# Patient Record
Sex: FEMALE | Race: WHITE | NOT HISPANIC OR LATINO
[De-identification: names, ages, dates, MRNs, and addresses within clinical notes are randomized per-mention and may not be internally consistent; named-entity substitution may affect disease eponyms.]

---

## 2017-07-21 PROBLEM — Z00.00 ENCOUNTER FOR PREVENTIVE HEALTH EXAMINATION: Status: ACTIVE | Noted: 2017-07-21

## 2017-07-26 ENCOUNTER — APPOINTMENT (OUTPATIENT)
Dept: OPHTHALMOLOGY | Facility: CLINIC | Age: 62
End: 2017-07-26

## 2017-07-26 DIAGNOSIS — H18.603 KERATOCONUS, UNSPECIFIED, BILATERAL: ICD-10-CM

## 2017-07-26 DIAGNOSIS — H53.142 VISUAL DISCOMFORT, LEFT EYE: ICD-10-CM

## 2017-07-26 DIAGNOSIS — Z96.1 PRESENCE OF INTRAOCULAR LENS: ICD-10-CM

## 2017-07-26 DIAGNOSIS — H35.351 CYSTOID MACULAR DEGENERATION, RIGHT EYE: ICD-10-CM

## 2017-07-26 DIAGNOSIS — H53.141 VISUAL DISCOMFORT, RIGHT EYE: ICD-10-CM

## 2017-08-30 ENCOUNTER — APPOINTMENT (OUTPATIENT)
Dept: OPHTHALMOLOGY | Facility: CLINIC | Age: 62
End: 2017-08-30
Payer: SELF-PAY

## 2017-08-30 PROCEDURE — 92015A: CUSTOM

## 2019-01-15 ENCOUNTER — APPOINTMENT (OUTPATIENT)
Dept: UROGYNECOLOGY | Facility: CLINIC | Age: 64
End: 2019-01-15
Payer: COMMERCIAL

## 2019-01-15 DIAGNOSIS — Z78.9 OTHER SPECIFIED HEALTH STATUS: ICD-10-CM

## 2019-01-15 DIAGNOSIS — H40.9 UNSPECIFIED GLAUCOMA: ICD-10-CM

## 2019-01-15 DIAGNOSIS — Z83.49 FAMILY HISTORY OF OTHER ENDOCRINE, NUTRITIONAL AND METABOLIC DISEASES: ICD-10-CM

## 2019-01-15 DIAGNOSIS — R31.29 OTHER MICROSCOPIC HEMATURIA: ICD-10-CM

## 2019-01-15 DIAGNOSIS — N39.8 OTHER SPECIFIED DISORDERS OF URINARY SYSTEM: ICD-10-CM

## 2019-01-15 DIAGNOSIS — K59.00 CONSTIPATION, UNSPECIFIED: ICD-10-CM

## 2019-01-15 DIAGNOSIS — N81.4 UTEROVAGINAL PROLAPSE, UNSPECIFIED: ICD-10-CM

## 2019-01-15 DIAGNOSIS — Z80.52 FAMILY HISTORY OF MALIGNANT NEOPLASM OF BLADDER: ICD-10-CM

## 2019-01-15 DIAGNOSIS — Z84.1 FAMILY HISTORY OF DISORDERS OF KIDNEY AND URETER: ICD-10-CM

## 2019-01-15 DIAGNOSIS — N95.2 POSTMENOPAUSAL ATROPHIC VAGINITIS: ICD-10-CM

## 2019-01-15 LAB
BILIRUB UR QL STRIP: NORMAL
CLARITY UR: CLEAR
COLLECTION METHOD: NORMAL
GLUCOSE UR-MCNC: NORMAL
HCG UR QL: 0.2 EU/DL
HGB UR QL STRIP.AUTO: NORMAL
KETONES UR-MCNC: NORMAL
LEUKOCYTE ESTERASE UR QL STRIP: NORMAL
NITRITE UR QL STRIP: NORMAL
PH UR STRIP: 6.5
PROT UR STRIP-MCNC: NORMAL
SP GR UR STRIP: 1

## 2019-01-15 PROCEDURE — 51725 SIMPLE CYSTOMETROGRAM: CPT

## 2019-01-15 PROCEDURE — 99204 OFFICE O/P NEW MOD 45 MIN: CPT | Mod: 25

## 2019-01-15 PROCEDURE — 81003 URINALYSIS AUTO W/O SCOPE: CPT | Mod: QW

## 2019-01-16 ENCOUNTER — RESULT REVIEW (OUTPATIENT)
Age: 64
End: 2019-01-16

## 2019-01-16 PROBLEM — N39.8 VOIDING DYSFUNCTION: Status: ACTIVE | Noted: 2019-01-16

## 2019-01-16 PROBLEM — Z78.9 RARELY CONSUMES ALCOHOL: Status: ACTIVE | Noted: 2019-01-16

## 2019-01-16 PROBLEM — Z78.9 NON-SMOKER: Status: ACTIVE | Noted: 2019-01-16

## 2019-01-16 PROBLEM — N95.2 VAGINAL ATROPHY: Status: ACTIVE | Noted: 2019-01-16

## 2019-01-16 PROBLEM — Z80.52 FAMILY HISTORY OF MALIGNANT NEOPLASM OF URINARY BLADDER: Status: ACTIVE | Noted: 2019-01-16

## 2019-01-16 PROBLEM — N81.4 UTERINE PROLAPSE: Status: ACTIVE | Noted: 2019-01-16

## 2019-01-16 PROBLEM — Z84.1 FAMILY HISTORY OF KIDNEY DISEASE: Status: ACTIVE | Noted: 2019-01-16

## 2019-01-16 PROBLEM — K59.00 CONSTIPATION: Status: ACTIVE | Noted: 2019-01-16

## 2019-01-16 PROBLEM — H40.9 GLAUCOMA: Status: ACTIVE | Noted: 2019-01-16

## 2019-01-16 PROBLEM — Z83.49 FAMILY HISTORY OF THYROID DISEASE: Status: ACTIVE | Noted: 2019-01-16

## 2019-01-16 PROBLEM — Z83.49 FAMILY HISTORY OF OBESITY: Status: ACTIVE | Noted: 2019-01-16

## 2019-01-16 RX ORDER — ROSUVASTATIN CALCIUM 10 MG/1
10 TABLET, FILM COATED ORAL
Refills: 0 | Status: ACTIVE | COMMUNITY

## 2019-01-16 RX ORDER — LANSOPRAZOLE 30 MG/1
30 CAPSULE, DELAYED RELEASE ORAL
Refills: 0 | Status: ACTIVE | COMMUNITY

## 2019-01-16 RX ORDER — ESTRADIOL 0.1 MG/G
0.1 CREAM VAGINAL
Qty: 1 | Refills: 0 | Status: ACTIVE | COMMUNITY
Start: 2019-01-16 | End: 1900-01-01

## 2019-01-16 RX ORDER — MV-MN/OM3/DHA/EPA/FISH/LUT/ZEA 250-5-1 MG
CAPSULE ORAL
Refills: 0 | Status: ACTIVE | COMMUNITY

## 2019-01-17 ENCOUNTER — RESULT REVIEW (OUTPATIENT)
Age: 64
End: 2019-01-17

## 2019-01-17 LAB — BACTERIA UR CULT: NORMAL

## 2019-02-04 ENCOUNTER — APPOINTMENT (OUTPATIENT)
Dept: UROGYNECOLOGY | Facility: CLINIC | Age: 64
End: 2019-02-04

## 2019-02-11 ENCOUNTER — RESULT REVIEW (OUTPATIENT)
Age: 64
End: 2019-02-11

## 2019-03-01 ENCOUNTER — RESULT REVIEW (OUTPATIENT)
Age: 64
End: 2019-03-01

## 2019-03-01 LAB
APPEARANCE: CLEAR
BACTERIA: NEGATIVE
BILIRUBIN URINE: NEGATIVE
BLOOD URINE: NEGATIVE
COLOR: YELLOW
GLUCOSE QUALITATIVE U: NEGATIVE MG/DL
KETONES URINE: NEGATIVE
LEUKOCYTE ESTERASE URINE: NEGATIVE
MICROSCOPIC-UA: NORMAL
NITRITE URINE: NEGATIVE
PH URINE: 6.5
PROTEIN URINE: NEGATIVE MG/DL
RED BLOOD CELLS URINE: 3 /HPF
SPECIFIC GRAVITY URINE: 1.01
SQUAMOUS EPITHELIAL CELLS: 0 /HPF
UROBILINOGEN URINE: NEGATIVE MG/DL
WHITE BLOOD CELLS URINE: 1 /HPF

## 2022-07-13 ENCOUNTER — APPOINTMENT (OUTPATIENT)
Dept: ORTHOPEDIC SURGERY | Facility: CLINIC | Age: 67
End: 2022-07-13

## 2022-07-13 VITALS — WEIGHT: 135 LBS | BODY MASS INDEX: 23.92 KG/M2 | HEIGHT: 63 IN

## 2022-07-13 PROCEDURE — 99213 OFFICE O/P EST LOW 20 MIN: CPT | Mod: 25

## 2022-07-13 PROCEDURE — 20611 DRAIN/INJ JOINT/BURSA W/US: CPT

## 2022-07-13 NOTE — DISCUSSION/SUMMARY
[de-identified] : Patient allowed to gently start resuming activities.\par Discussed change to medication prescription and usage. \par Offered cortisone steroid injection. \par Bracing options discussed with patient. \par Hyaluronic Acid inj pamphlet given to pt\par Name of Insurance\par Insurance Address:\par \par 07/13/2022 \par \par  \par \par RE:  [employer] \par \par Acct #- [id number] \par \par \par Attention:  Nurse Reviewer /Medical Director\par \par I am writing this letter as a medical necessity for HA orthovisc R knee\par Patient has tried analgesics, non-steroid anti-inflammatory agents, \par physical therapy, hot or cold compresses,injections of corticosteroids, etc)  which in combination or by themselves has not worked.\par Based on my patient's condition, I strongly believe that the Hyaluronic aid injections is medically needed.\par  \par Thank you for your time and consideration.   \par  Name of Insurance\par Insurance Address:\par \par 07/13/2022 \par \par  \par \par RE:  [employer] \par \par Acct #- [id number] \par \par \par Attention:  Nurse Reviewer /Medical Director\par \par I am writing this letter as a medical necessity for PT program.\par Patient has tried analgesics, non-steroid anti-inflammatory agents, \par hot or cold compresses,injections of corticosteroids, etc)  which in combination or by themselves has not worked.\par Based on my patient's condition, I strongly believe that the PT is medically needed.\par  \par Thank you for your time and consideration.   \par  \par \par \par \par \par \par \par \par

## 2022-07-13 NOTE — HISTORY OF PRESENT ILLNESS
[4] : 4 [de-identified] : has known OA and had orthovisc Oct25, worse after sitting and has stiffness no swelling [FreeTextEntry1] : right knee [de-identified] : none

## 2022-07-19 ENCOUNTER — APPOINTMENT (OUTPATIENT)
Dept: ORTHOPEDIC SURGERY | Facility: CLINIC | Age: 67
End: 2022-07-19

## 2022-07-19 VITALS — BODY MASS INDEX: 23.92 KG/M2 | HEIGHT: 63 IN | WEIGHT: 135 LBS

## 2022-07-19 DIAGNOSIS — M70.51 OTHER BURSITIS OF KNEE, RIGHT KNEE: ICD-10-CM

## 2022-07-19 PROCEDURE — 99024 POSTOP FOLLOW-UP VISIT: CPT

## 2022-07-19 PROCEDURE — 20611 DRAIN/INJ JOINT/BURSA W/US: CPT | Mod: RT

## 2022-07-19 NOTE — DISCUSSION/SUMMARY
[de-identified] : Patient allowed to gently start resuming activities.\par Discussed change to medication prescription and usage. \par

## 2022-07-26 ENCOUNTER — APPOINTMENT (OUTPATIENT)
Dept: ORTHOPEDIC SURGERY | Facility: CLINIC | Age: 67
End: 2022-07-26

## 2022-07-26 VITALS — HEIGHT: 63 IN | WEIGHT: 135 LBS | BODY MASS INDEX: 23.92 KG/M2

## 2022-07-26 PROCEDURE — 99213 OFFICE O/P EST LOW 20 MIN: CPT | Mod: 25

## 2022-07-26 PROCEDURE — 99024 POSTOP FOLLOW-UP VISIT: CPT

## 2022-07-26 PROCEDURE — 20611 DRAIN/INJ JOINT/BURSA W/US: CPT | Mod: RT

## 2022-07-26 NOTE — PHYSICAL EXAM
[Right] : right knee [5___] : hamstring 5[unfilled]/5 [Equivocal] : equivocal Leah [] : negative Lachmann [[id number]] : flexion 120 degrees [de-identified] : extension 0 degrees

## 2022-07-26 NOTE — PROCEDURE
[FreeTextEntry3] : Orthovisc (Large Joint) with Ultrasound Guidance\par Viscosupplementation Injection: X-ray evidence of Osteoarthritis on this or prior visit and Patient has tried OTC's including aspirin, Ibuprofen, Aleve etc or prescription NSAIDS, and/or exercises at home and/ or physical therapy without satisfactory response. \par An injection of Orthovisc 2ml # 3 was injected into the right knee(s). after verbal consent using sterile technique. The risks, benefits, and alternatives to Viscosupplementation injection were explained in full to the patient. Risks outlined include but are not limited to infection, sepsis, bleeding, scarring, skin discoloration, temporary increase in pain, syncopal episode, failure to resolve symptoms, allergic reaction, and symptom recurrence. Signs and symptoms of infection reviewed and patient advised to call immediately for redness, fevers, and/or chills. Patient understood the risks. All questions were answered. After discussion of options, patient requested Viscosupplementation. Oral informed consent was obtained and sterile prep was done of the injection site. Sterile technique was used without complications. The patient tolerated the procedure well. Ice tonight to the injection site. \par \par Ultrasound Guidance was used for the following reasons: prior failure or difficult injection. \par \par Ultrasound guided injection was performed of the knee, visualization of the needle and placement of injection was performed without complication. \par \par

## 2022-07-26 NOTE — HISTORY OF PRESENT ILLNESS
[3] : 3 [Orthovisc] : Orthovisc [] : no [de-identified] : 07/19/2022 [de-identified] : right knee

## 2022-08-02 ENCOUNTER — APPOINTMENT (OUTPATIENT)
Dept: ORTHOPEDIC SURGERY | Facility: CLINIC | Age: 67
End: 2022-08-02

## 2022-08-02 VITALS — WEIGHT: 135 LBS | HEIGHT: 63 IN | BODY MASS INDEX: 23.92 KG/M2

## 2022-08-02 PROCEDURE — 99213 OFFICE O/P EST LOW 20 MIN: CPT | Mod: 25

## 2022-08-02 PROCEDURE — 20611 DRAIN/INJ JOINT/BURSA W/US: CPT | Mod: RT

## 2022-08-02 NOTE — HISTORY OF PRESENT ILLNESS
[4] : 4 [Orthovisc] : Orthovisc [de-identified] : Patient returns for Orthovisc #4 right knee.  [] : no [de-identified] : 07/26/2022 [de-identified] : right knee

## 2022-08-02 NOTE — DISCUSSION/SUMMARY
[de-identified] : Name of Insurance\par Insurance Address:\par \par 08/02/2022 \par \par  \par \par RE:  [employer] \par \par Acct #- [id number] \par \par \par Attention:  Nurse Reviewer /Medical Director\par \par I am writing this letter as a medical necessity for PT program.\par Patient has tried analgesics, non-steroid anti-inflammatory agents, \par hot or cold compresses,injections of corticosteroids, etc)  which in combination or by themselves has not worked.\par Based on my patient's condition, I strongly believe that the PT is medically needed.\par  \par Thank you for your time and consideration.   \par  \par \par ice\par  modify activities

## 2022-08-02 NOTE — PROCEDURE
[FreeTextEntry3] : Orthovisc (Large Joint) with Ultrasound Guidance\par Viscosupplementation Injection: X-ray evidence of Osteoarthritis on this or prior visit and Patient has tried OTC's including aspirin, Ibuprofen, Aleve etc or prescription NSAIDS, and/or exercises at home and/ or physical therapy without satisfactory response. \par An injection of Orthovisc 2ml # 4 was injected into the right knee(s). after verbal consent using sterile technique. The risks, benefits, and alternatives to Viscosupplementation injection were explained in full to the patient. Risks outlined include but are not limited to infection, sepsis, bleeding, scarring, skin discoloration, temporary increase in pain, syncopal episode, failure to resolve symptoms, allergic reaction, and symptom recurrence. Signs and symptoms of infection reviewed and patient advised to call immediately for redness, fevers, and/or chills. Patient understood the risks. All questions were answered. After discussion of options, patient requested Viscosupplementation. Oral informed consent was obtained and sterile prep was done of the injection site. Sterile technique was used without complications. The patient tolerated the procedure well. Ice tonight to the injection site. \par \par Ultrasound Guidance was used for the following reasons: prior failure or difficult injection. \par \par Ultrasound guided injection was performed of the knee, visualization of the needle and placement of injection was performed without complication. \par \par

## 2022-11-14 ENCOUNTER — FORM ENCOUNTER (OUTPATIENT)
Age: 67
End: 2022-11-14

## 2022-11-14 ENCOUNTER — APPOINTMENT (OUTPATIENT)
Dept: ORTHOPEDIC SURGERY | Facility: CLINIC | Age: 67
End: 2022-11-14
Payer: MEDICARE

## 2022-11-14 DIAGNOSIS — M25.559 PAIN IN UNSPECIFIED HIP: ICD-10-CM

## 2022-11-14 PROCEDURE — 73503 X-RAY EXAM HIP UNI 4/> VIEWS: CPT | Mod: LT

## 2022-11-14 PROCEDURE — 99214 OFFICE O/P EST MOD 30 MIN: CPT

## 2022-11-14 NOTE — HISTORY OF PRESENT ILLNESS
[Left Leg] : left leg [Gradual] : gradual [Sudden] : sudden [6] : 6 [5] : 5 [Burning] : burning [Dull/Aching] : dull/aching [Stabbing] : stabbing [Intermittent] : intermittent [Rest] : rest [Exercising] : exercising [Retired] : Work status: retired [de-identified] : 67F p/w L hip pain. She tripped and fell last wednesday onto her left side. She has had pain since then but been able to ambulate. Notes increased pain over the weekend and obtained an MRI. [] : Post Surgical Visit: no [FreeTextEntry1] : left hip  [FreeTextEntry5] : Pt recently tripped and fell and injured her left hip, pts  is a physician so they had an mri of the left hip that showed a few fractures as well as a possible dislocation \par \par Ptr also has a hx of a left hip inj with no relief \par \par  [FreeTextEntry9] : advil  [de-identified] : mris  [de-identified] : none

## 2022-11-14 NOTE — ASSESSMENT
[FreeTextEntry1] : 67F p/w minimally displaced L posterior wall acetabulum fx\par \par Begin TTWB\par Discussed potential need for ORIF vs LUCRETIA in the future\par She would like to avoid surgery if possible\par return 2 weeks for repeat films

## 2022-11-14 NOTE — PHYSICAL EXAM
[2+] : dorsalis pedis pulse: 2+ [] : light touch intact throughout [Left] : left hip with pelvis [All Views] : anteroposterior, lateral [The fracture is in acceptable alignment. There is progression in healing seen] : The fracture is in acceptable alignment. There is progression in healing seen [Mild arthritis (Tonnis Grade 1)] : Mild arthritis (Tonnis Grade 1)

## 2022-11-14 NOTE — DATA REVIEWED
[MRI] : MRI [Left] : left [Hip] : hip [FreeTextEntry1] : [facility] 11/13/22\par Acute posterior left acetabular wall fracture without significant displacement. Acute trabecular fracture of the left femoral head. The bone marrow edema/fracture pattern can be seen in the setting of a posterior hip dislocation\par however clinical correlation is recommended.\par \par Nondisplaced tearing of the left acetabular labrum with focal cartilage loss along the lateral acetabular rim adjacent to the labral tear. The left hip cartilage is otherwise grossly preserved.\par \par Partial tearing of the ligamentum teres.\par \par Chronic fraying at the left hamstring tendon origin.\par \par Moderate-sized left hip joint effusion.

## 2022-12-02 ENCOUNTER — APPOINTMENT (OUTPATIENT)
Dept: ORTHOPEDIC SURGERY | Facility: CLINIC | Age: 67
End: 2022-12-02

## 2022-12-02 VITALS — HEIGHT: 63 IN | WEIGHT: 135 LBS | BODY MASS INDEX: 23.92 KG/M2

## 2022-12-02 PROCEDURE — 72170 X-RAY EXAM OF PELVIS: CPT

## 2022-12-02 PROCEDURE — 99214 OFFICE O/P EST MOD 30 MIN: CPT

## 2022-12-02 NOTE — PHYSICAL EXAM
[2+] : dorsalis pedis pulse: 2+ [Left] : left hip with pelvis [All Views] : anteroposterior, lateral [The fracture is in acceptable alignment. There is progression in healing seen] : The fracture is in acceptable alignment. There is progression in healing seen [Mild arthritis (Tonnis Grade 1)] : Mild arthritis (Tonnis Grade 1) [] : no erythema

## 2022-12-02 NOTE — HISTORY OF PRESENT ILLNESS
[Left Leg] : left leg [Gradual] : gradual [Sudden] : sudden [6] : 6 [3] : 3 [Burning] : burning [Dull/Aching] : dull/aching [Intermittent] : intermittent [Rest] : rest [Exercising] : exercising [Retired] : Work status: retired [de-identified] : 67F p/w L hip pain. She tripped and fell last wednesday onto her left side. She has had pain since then but been able to ambulate. Notes increased pain over the weekend and obtained an MRI.\par \par 12/2/22 f/u L acetabular fracture, she is feeling a little better today, she has not been totally compliant with TTWB, ambulating without walker sometimes [] : Post Surgical Visit: no [FreeTextEntry1] : left hip  [FreeTextEntry9] : advil  [de-identified] : mris  [de-identified] : none

## 2022-12-02 NOTE — ASSESSMENT
[FreeTextEntry1] : 67F p/w minimally displaced L posterior wall acetabulum fx\par \par Begin TTWB\par Discussed potential need for ORIF vs LUCRETIA in the future\par She would like to avoid surgery if possible\par tramadol and meloxicam refilled\par return 2 weeks for repeat films

## 2022-12-21 ENCOUNTER — APPOINTMENT (OUTPATIENT)
Dept: ORTHOPEDIC SURGERY | Facility: CLINIC | Age: 67
End: 2022-12-21
Payer: MEDICARE

## 2022-12-21 VITALS — BODY MASS INDEX: 23.92 KG/M2 | WEIGHT: 135 LBS | HEIGHT: 63 IN

## 2022-12-21 PROCEDURE — 99214 OFFICE O/P EST MOD 30 MIN: CPT

## 2022-12-21 PROCEDURE — 72170 X-RAY EXAM OF PELVIS: CPT

## 2022-12-21 NOTE — ASSESSMENT
[FreeTextEntry1] : 67F p/w minimally displaced L posterior wall acetabulum fx\par \par OK for WBAT\par Discussed potential need for ORIF vs LUCRETIA in the future\par She would like to avoid surgery if possible\par tramadol and meloxicam refilled\par Begin PT\par return 4 weeks for repeat films

## 2022-12-21 NOTE — HISTORY OF PRESENT ILLNESS
[Left Leg] : left leg [Gradual] : gradual [Sudden] : sudden [6] : 6 [3] : 3 [Burning] : burning [Dull/Aching] : dull/aching [Intermittent] : intermittent [Rest] : rest [Exercising] : exercising [Retired] : Work status: retired [de-identified] : 67F p/w L hip pain. She tripped and fell last wednesday onto her left side. She has had pain since then but been able to ambulate. Notes increased pain over the weekend and obtained an MRI.\par \par 12/2/22 f/u L acetabular fracture, she is feeling a little better today, she has not been totally compliant with TTWB, ambulating without walker sometimes\par \par 12/21/2022: Pt states left hip there's a little pain but improving slowly, more compliant with TTWB  [] : Post Surgical Visit: no [FreeTextEntry1] : left hip  [FreeTextEntry9] : advil  [de-identified] : mris  [de-identified] : none

## 2023-01-16 ENCOUNTER — APPOINTMENT (OUTPATIENT)
Dept: ORTHOPEDIC SURGERY | Facility: CLINIC | Age: 68
End: 2023-01-16
Payer: MEDICARE

## 2023-01-16 VITALS — BODY MASS INDEX: 23.92 KG/M2 | WEIGHT: 135 LBS | HEIGHT: 63 IN

## 2023-01-16 VITALS — HEIGHT: 63 IN | WEIGHT: 135 LBS | BODY MASS INDEX: 23.92 KG/M2

## 2023-01-16 DIAGNOSIS — S32.402A UNSPECIFIED FRACTURE OF LEFT ACETABULUM, INITIAL ENCOUNTER FOR CLOSED FRACTURE: ICD-10-CM

## 2023-01-16 PROCEDURE — 99214 OFFICE O/P EST MOD 30 MIN: CPT

## 2023-01-16 PROCEDURE — 72170 X-RAY EXAM OF PELVIS: CPT

## 2023-01-16 NOTE — HISTORY OF PRESENT ILLNESS
[Burning] : burning [Dull/Aching] : dull/aching [4] : 4 [2] : 2 [Throbbing] : throbbing [de-identified] : 67F p/w L hip pain. She tripped and fell last wednesday onto her left side. She has had pain since then but been able to ambulate. Notes increased pain over the weekend and obtained an MRI.\par \par 12/2/22 f/u L acetabular fracture, she is feeling a little better today, she has not been totally compliant with TTWB, ambulating without walker sometimes\par \par 12/21/2022: Pt states left hip there's a little pain but improving slowly, more compliant with TTWB \par 1/16/23 f/u L hip, ambulating well, feels weak with longer distances, has been doing PT and improving [FreeTextEntry1] : left hip  [de-identified] : PT

## 2023-01-16 NOTE — ASSESSMENT
[FreeTextEntry1] : 67F p/w minimally displaced L posterior wall acetabulum fx\par \par OK for WBAT\par Discussed potential need for  LUCRETIA in the future\par tramadol and meloxicam\par Continue PT\par return 8 weeks for repeat films

## 2023-04-26 ENCOUNTER — NON-APPOINTMENT (OUTPATIENT)
Age: 68
End: 2023-04-26

## 2023-04-30 ENCOUNTER — NON-APPOINTMENT (OUTPATIENT)
Age: 68
End: 2023-04-30

## 2023-05-01 ENCOUNTER — NON-APPOINTMENT (OUTPATIENT)
Age: 68
End: 2023-05-01

## 2023-05-01 ENCOUNTER — APPOINTMENT (OUTPATIENT)
Dept: SURGICAL ONCOLOGY | Facility: CLINIC | Age: 68
End: 2023-05-01
Payer: MEDICARE

## 2023-05-01 VITALS
HEART RATE: 81 BPM | WEIGHT: 135 LBS | OXYGEN SATURATION: 98 % | SYSTOLIC BLOOD PRESSURE: 119 MMHG | BODY MASS INDEX: 23.92 KG/M2 | RESPIRATION RATE: 16 BRPM | HEIGHT: 63 IN | DIASTOLIC BLOOD PRESSURE: 83 MMHG

## 2023-05-01 PROCEDURE — 99205 OFFICE O/P NEW HI 60 MIN: CPT

## 2023-05-12 ENCOUNTER — RESULT REVIEW (OUTPATIENT)
Age: 68
End: 2023-05-12

## 2023-05-12 ENCOUNTER — OUTPATIENT (OUTPATIENT)
Dept: OUTPATIENT SERVICES | Facility: HOSPITAL | Age: 68
LOS: 1 days | End: 2023-05-12
Payer: MEDICARE

## 2023-05-12 DIAGNOSIS — D03.71 MELANOMA IN SITU OF RIGHT LOWER LIMB, INCLUDING HIP: ICD-10-CM

## 2023-05-12 DIAGNOSIS — C43.70 MALIGNANT MELANOMA OF UNSPECIFIED LOWER LIMB, INCLUDING HIP: ICD-10-CM

## 2023-05-12 DIAGNOSIS — L82.0 INFLAMED SEBORRHEIC KERATOSIS: ICD-10-CM

## 2023-05-12 PROCEDURE — 88321 CONSLTJ&REPRT SLD PREP ELSWR: CPT

## 2023-05-15 LAB — SURGICAL PATHOLOGY STUDY: SIGNIFICANT CHANGE UP

## 2023-05-18 ENCOUNTER — APPOINTMENT (OUTPATIENT)
Dept: PLASTIC SURGERY | Facility: CLINIC | Age: 68
End: 2023-05-18
Payer: MEDICARE

## 2023-05-18 VITALS
WEIGHT: 135 LBS | HEART RATE: 80 BPM | BODY MASS INDEX: 23.62 KG/M2 | SYSTOLIC BLOOD PRESSURE: 119 MMHG | TEMPERATURE: 97.3 F | DIASTOLIC BLOOD PRESSURE: 78 MMHG | HEIGHT: 63.5 IN | OXYGEN SATURATION: 99 %

## 2023-05-18 DIAGNOSIS — M85.80 OTHER SPECIFIED DISORDERS OF BONE DENSITY AND STRUCTURE, UNSPECIFIED SITE: ICD-10-CM

## 2023-05-18 DIAGNOSIS — Z90.722 ACQUIRED ABSENCE OF BOTH CERVIX AND UTERUS: ICD-10-CM

## 2023-05-18 DIAGNOSIS — Z90.79 ACQUIRED ABSENCE OF BOTH CERVIX AND UTERUS: ICD-10-CM

## 2023-05-18 DIAGNOSIS — Z90.710 ACQUIRED ABSENCE OF BOTH CERVIX AND UTERUS: ICD-10-CM

## 2023-05-18 DIAGNOSIS — K21.9 GASTRO-ESOPHAGEAL REFLUX DISEASE W/OUT ESOPHAGITIS: ICD-10-CM

## 2023-05-18 PROCEDURE — 99204 OFFICE O/P NEW MOD 45 MIN: CPT

## 2023-05-18 RX ORDER — TRAMADOL HYDROCHLORIDE 50 MG/1
50 TABLET, COATED ORAL
Qty: 28 | Refills: 0 | Status: DISCONTINUED | COMMUNITY
Start: 2022-12-02 | End: 2023-05-18

## 2023-05-18 RX ORDER — MENTHOL/CAMPHOR 0.5 %-0.5%
1000 LOTION (ML) TOPICAL
Refills: 0 | Status: DISCONTINUED | COMMUNITY
End: 2023-05-18

## 2023-05-18 RX ORDER — MELOXICAM 15 MG/1
15 TABLET ORAL
Qty: 30 | Refills: 1 | Status: DISCONTINUED | COMMUNITY
Start: 2022-12-21 | End: 2023-05-18

## 2023-05-18 RX ORDER — MELOXICAM 15 MG/1
15 TABLET ORAL
Qty: 30 | Refills: 1 | Status: DISCONTINUED | COMMUNITY
Start: 2022-12-02 | End: 2023-05-18

## 2023-05-18 RX ORDER — TRAMADOL HYDROCHLORIDE 50 MG/1
50 TABLET, COATED ORAL
Qty: 28 | Refills: 0 | Status: DISCONTINUED | COMMUNITY
Start: 2022-12-21 | End: 2023-05-18

## 2023-05-18 RX ORDER — TRAMADOL HYDROCHLORIDE 50 MG/1
50 TABLET, COATED ORAL
Qty: 28 | Refills: 0 | Status: DISCONTINUED | COMMUNITY
Start: 2022-11-15 | End: 2023-05-18

## 2023-05-18 RX ORDER — CLOTRIMAZOLE AND BETAMETHASONE DIPROPIONATE 10; .5 MG/G; MG/G
1-0.05 CREAM TOPICAL
Qty: 1 | Refills: 4 | Status: DISCONTINUED | COMMUNITY
Start: 2019-01-16 | End: 2023-05-18

## 2023-05-18 NOTE — CONSULT LETTER
[Dear  ___] : Dear  [unfilled], [Consult Letter:] : I had the pleasure of evaluating your patient, [unfilled]. [Please see my note below.] : Please see my note below. [Consult Closing:] : Thank you very much for allowing me to participate in the care of this patient.  If you have any questions, please do not hesitate to contact me. [Sincerely,] : Sincerely, [FreeTextEntry3] : Rashi Moss MD, FACS

## 2023-05-22 ENCOUNTER — OUTPATIENT (OUTPATIENT)
Dept: OUTPATIENT SERVICES | Facility: HOSPITAL | Age: 68
LOS: 1 days | End: 2023-05-22

## 2023-05-22 VITALS
HEART RATE: 84 BPM | RESPIRATION RATE: 16 BRPM | HEIGHT: 63.25 IN | WEIGHT: 136.03 LBS | DIASTOLIC BLOOD PRESSURE: 67 MMHG | SYSTOLIC BLOOD PRESSURE: 98 MMHG | OXYGEN SATURATION: 97 % | TEMPERATURE: 98 F

## 2023-05-22 DIAGNOSIS — C43.70 MALIGNANT MELANOMA OF UNSPECIFIED LOWER LIMB, INCLUDING HIP: ICD-10-CM

## 2023-05-22 DIAGNOSIS — Z98.890 OTHER SPECIFIED POSTPROCEDURAL STATES: Chronic | ICD-10-CM

## 2023-05-22 DIAGNOSIS — Z98.49 CATARACT EXTRACTION STATUS, UNSPECIFIED EYE: Chronic | ICD-10-CM

## 2023-05-22 DIAGNOSIS — N32.81 OVERACTIVE BLADDER: ICD-10-CM

## 2023-05-22 DIAGNOSIS — K21.9 GASTRO-ESOPHAGEAL REFLUX DISEASE WITHOUT ESOPHAGITIS: ICD-10-CM

## 2023-05-22 DIAGNOSIS — Z90.710 ACQUIRED ABSENCE OF BOTH CERVIX AND UTERUS: Chronic | ICD-10-CM

## 2023-05-22 LAB
BLD GP AB SCN SERPL QL: NEGATIVE — SIGNIFICANT CHANGE UP
RH IG SCN BLD-IMP: POSITIVE — SIGNIFICANT CHANGE UP

## 2023-05-22 RX ORDER — SODIUM CHLORIDE 9 MG/ML
1000 INJECTION, SOLUTION INTRAVENOUS
Refills: 0 | Status: DISCONTINUED | OUTPATIENT
Start: 2023-06-06 | End: 2023-06-20

## 2023-05-22 NOTE — H&P PST ADULT - PROBLEM SELECTOR PROBLEM 3
[de-identified] : 
OAB (overactive bladder)

## 2023-05-22 NOTE — H&P PST ADULT - NSICDXFAMILYHX_GEN_ALL_CORE_FT
FAMILY HISTORY:  Father  Still living? No  FH: heart disease, Age at diagnosis: Age Unknown    Mother  Still living? No  FH: bladder cancer, Age at diagnosis: Age Unknown

## 2023-05-22 NOTE — H&P PST ADULT - NSICDXPASTMEDICALHX_GEN_ALL_CORE_FT
PAST MEDICAL HISTORY:  HLD (hyperlipidemia)      PAST MEDICAL HISTORY:  Female cystocele     GERD (gastroesophageal reflux disease)     Glaucoma     HLD (hyperlipidemia)      PAST MEDICAL HISTORY:  Female cystocele     GERD (gastroesophageal reflux disease)     Glaucoma     HLD (hyperlipidemia)     OAB (overactive bladder)

## 2023-05-22 NOTE — H&P PST ADULT - PROBLEM SELECTOR PLAN 2
Pt instructed to take prevacid AM of surgery with a sip of water, pt able to verbalize understanding.

## 2023-05-22 NOTE — H&P PST ADULT - ATTENDING COMMENTS
67 y.o. 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 and plastics closure (Dr Moss)    oncologic diagnosis, surgical approach, risks, benefits and possible surgical outcomes reviewed in detail, all questions answered.    consent on chart

## 2023-05-22 NOTE — H&P PST ADULT - PROBLEM SELECTOR PLAN 3
Pt instructed to take Myrbetriq AM of surgery with a sip of water, pt able to verbalize understanding.

## 2023-05-22 NOTE — H&P PST ADULT - OPHTHALMOLOGIC COMMENTS
"I don't see that well but its stable and I can drive" "I don't see that well but its stable and I can drive" right worse than left

## 2023-05-22 NOTE — H&P PST ADULT - HISTORY OF PRESENT ILLNESS
68 yo female with preop dx. malignant melanoma unspecified lower limb presents to pre surgical testing.  Pt is scheduled for wide excision melanoma right foot(Dr. Moss to add code).  66 yo female with preop dx. malignant melanoma unspecified lower limb presents to pre surgical testing.  Pt reports she was seen by dermatology for a left temporal lesion, found to have a suspicious lesion on her right foot s/p biopsy revealing melanoma in situ.  Pt is scheduled for wide excision melanoma right foot(Dr. Moss to add code).

## 2023-05-22 NOTE — H&P PST ADULT - PROBLEM SELECTOR PLAN 1
Pt is scheduled for wide excision melanoma right foot(Dr. Moss to add code) on 6/6/23.  Verbal and written pre op instructions reviewed with patient and pt able to verbalize understanding.   Verbal and written instructions given with chlorhexidine wash, pt able to verbalize understanding via teach back method.

## 2023-05-22 NOTE — H&P PST ADULT - NSICDXPASTSURGICALHX_GEN_ALL_CORE_FT
PAST SURGICAL HISTORY:  History of hysterectomy      PAST SURGICAL HISTORY:  History of hysterectomy     History of trabeculectomy     History of vitrectomy     S/P cataract extraction

## 2023-06-05 ENCOUNTER — TRANSCRIPTION ENCOUNTER (OUTPATIENT)
Age: 68
End: 2023-06-05

## 2023-06-05 NOTE — ASU PATIENT PROFILE, ADULT - NSICDXPASTMEDICALHX_GEN_ALL_CORE_FT
PAST MEDICAL HISTORY:  Female cystocele     GERD (gastroesophageal reflux disease)     Glaucoma     HLD (hyperlipidemia)     OAB (overactive bladder)

## 2023-06-05 NOTE — ASU PATIENT PROFILE, ADULT - NSICDXPASTSURGICALHX_GEN_ALL_CORE_FT
PAST SURGICAL HISTORY:  History of hysterectomy     History of trabeculectomy     History of vitrectomy     S/P cataract extraction

## 2023-06-05 NOTE — ASU PATIENT PROFILE, ADULT - MEDICATION HERBAL REMEDIES, PROFILE
Goal Outcome Evaluation:    DATE & TIME: 01/07/23, 2253-5748  Summary: Failure to thrive, pressure ulcers, placement   Cognitive Concerns/ Orientation : A&O x 3, ds to place    BEHAVIOR & AGGRESSION TOOL COLOR: GREEN  ABNL VS/O2: VSS on RA, Hypotensive at 11:43- RRT- refer to note  MOBILITY: A x 2, lift   PAIN MANAGMENT: C/O buttock pain, Managed with tylenol- Pt then stated she did not want anymore tylenol  DIET: Mod carb  BOWEL/BLADDER: Incont B/B, manrique in place   ABNL LAB/BG: Lactic acid 2.3, K 3.3- replaced- recheck 1816  DRAIN/DEVICES: R PIV SL   SKIN: Stage 4 PU on coccyx, Stage 3 PU on R Lateral lower leg  D/C DAY/GOALS/PLACE: TBD         no

## 2023-06-05 NOTE — ASU PATIENT PROFILE, ADULT - FALL HARM RISK - UNIVERSAL INTERVENTIONS
Bed in lowest position, wheels locked, appropriate side rails in place/Call bell, personal items and telephone in reach/Instruct patient to call for assistance before getting out of bed or chair/Non-slip footwear when patient is out of bed/[place] to call system/Physically safe environment - no spills, clutter or unnecessary equipment/Purposeful Proactive Rounding/Room/bathroom lighting operational, light cord in reach

## 2023-06-05 NOTE — ASU PATIENT PROFILE, ADULT - BILL OF RIGHTS/ADMISSION INFORMATION PROVIDED TO:
DERMATOLOGY  MINOR WOUND CARE      You may shower immediately following the procedure.    Wound care for 4-5 days:  • Wash your hands with soap and water  • Wash area gently one time daily with soap and water  • Dry area with clean towel  • Apply Vaseline and a Band-Aid, if needed    Notify the clinic if any of the following occur:  • Increased redness or swelling that continues to spread  • Pus draining from wound  • Fever, chills, flu-like symptoms  • Bleeding that persists despite FIRM PRESSURE applied for 2 sessions of 15 CONTINUOUS minutes, NO PEEKING!!    What if I am having some discomfort?  Following minor procedure, the discomfort is usually mild.  You may use Tylenol, Extra Strength Tylenol, Motrin, Aleve, Naproxen or Ibuprofen.    If you are already taking daily aspirin or other blood thinners, you do NOT have to discontinue your daily use.      For any concerns, call the Dermatology clinic at:  • [phone] during business hours Monday-Friday, evenings after 5pm, weekends, holidays        (rev 01/18)      During monthly skin self-exam look for the following ABCD'S of skin cancer  A. Asymmetry: One half of the area does not match the other half  B. Border: The edges are uneven or ragged  C. Color: The color is uneven with more than one shade or color  present  D. Diameter: Any change in size, or if the size is larger than a pencil  eraser  S. Sensation: There are changes in the way it feels (itching, dryness,  scaling, lumpy, swollen, tender)  Sunscreen and sun protection    1. Sunscreen should be broad-spectrum protection (protects against UVA and UVB rays), Sun Protection Factor (SPF) 30 or greater, and water-resistant.   2. Apply the sunscreen to dry skin 15 minutes BEFORE going outdoors. Be sure to apply it generously to achieve the UV protection indicated on the product label.   3. Re-apply sunscreen approximately every two hours or after swimming or sweating heavily according to the directions on  the bottle.  4. Skin cancer also can form on the lips. To protect your lips, apply a lip balm or lipstick that contains sunscreen with an SPF of 30 or higher.  5. There are many types of sunscreen.   A. Creams are best for dry skin and the face.   B. Gels are good for hairy areas, such as the scalp or male chest.   C. Sticks are good to use around the eyes.   D. Sprays are sometimes preferred by parents since they are easy to apply to children. Make sure to use enough of these products to cover the entire surface area thoroughly. Do not inhale these products or apply near heat, open flame or while smoking.   E. There also are sunscreens made for specific purposes, such as for sensitive skin and babies.   6. Wear protective clothing, such as a long-sleeved shirt, pants, a wide-brimmed hat and sunglasses, where possible.  7. Seek shade when appropriate, remembering that the sun’s rays are strongest between 10 a.m. and 2 p.m. If your shadow is shorter than you are, seek shade.  8. Use extra caution near water, snow and sand as they reflect the damaging rays of the sun, which can increase your chance of sunburn.  9. Get vitamin D safely through a healthy diet that may include vitamin supplements. Don’t seek the sun.  10. Avoid tanning beds. Ultraviolet light from the sun and tanning beds can cause skin cancer and wrinkling. If you want to look tan, consider using a self-tanning product, but continue to use sunscreen with it.      Patient

## 2023-06-06 ENCOUNTER — APPOINTMENT (OUTPATIENT)
Dept: PLASTIC SURGERY | Facility: HOSPITAL | Age: 68
End: 2023-06-06

## 2023-06-06 ENCOUNTER — TRANSCRIPTION ENCOUNTER (OUTPATIENT)
Age: 68
End: 2023-06-06

## 2023-06-06 ENCOUNTER — RESULT REVIEW (OUTPATIENT)
Age: 68
End: 2023-06-06

## 2023-06-06 ENCOUNTER — APPOINTMENT (OUTPATIENT)
Dept: SURGICAL ONCOLOGY | Facility: HOSPITAL | Age: 68
End: 2023-06-06

## 2023-06-06 ENCOUNTER — OUTPATIENT (OUTPATIENT)
Dept: OUTPATIENT SERVICES | Facility: HOSPITAL | Age: 68
LOS: 1 days | Discharge: HOME | End: 2023-06-06
Payer: MEDICARE

## 2023-06-06 VITALS
WEIGHT: 136.03 LBS | HEART RATE: 67 BPM | RESPIRATION RATE: 15 BRPM | TEMPERATURE: 98 F | SYSTOLIC BLOOD PRESSURE: 115 MMHG | OXYGEN SATURATION: 100 % | DIASTOLIC BLOOD PRESSURE: 68 MMHG | HEIGHT: 63.25 IN

## 2023-06-06 VITALS
OXYGEN SATURATION: 97 % | DIASTOLIC BLOOD PRESSURE: 62 MMHG | RESPIRATION RATE: 15 BRPM | HEART RATE: 66 BPM | SYSTOLIC BLOOD PRESSURE: 120 MMHG

## 2023-06-06 DIAGNOSIS — Z90.710 ACQUIRED ABSENCE OF BOTH CERVIX AND UTERUS: Chronic | ICD-10-CM

## 2023-06-06 DIAGNOSIS — Z98.890 OTHER SPECIFIED POSTPROCEDURAL STATES: Chronic | ICD-10-CM

## 2023-06-06 DIAGNOSIS — C43.70 MALIGNANT MELANOMA OF UNSPECIFIED LOWER LIMB, INCLUDING HIP: ICD-10-CM

## 2023-06-06 DIAGNOSIS — Z98.49 CATARACT EXTRACTION STATUS, UNSPECIFIED EYE: Chronic | ICD-10-CM

## 2023-06-06 PROCEDURE — 88342 IMHCHEM/IMCYTCHM 1ST ANTB: CPT | Mod: 26

## 2023-06-06 PROCEDURE — 88305 TISSUE EXAM BY PATHOLOGIST: CPT | Mod: 26

## 2023-06-06 PROCEDURE — 11623 EXC S/N/H/F/G MAL+MRG 2.1-3: CPT

## 2023-06-06 PROCEDURE — 15240 FTH/GFT F/C/C/M/N/AX/G/H/F20: CPT

## 2023-06-06 RX ORDER — NETARSUDIL AND LATANOPROST OPHTHALMIC SOLUTION, 0.02%/0.005% .2; .05 MG/ML; MG/ML
1 SOLUTION/ DROPS OPHTHALMIC; TOPICAL
Refills: 0 | DISCHARGE

## 2023-06-06 RX ORDER — LANSOPRAZOLE 15 MG/1
1 CAPSULE, DELAYED RELEASE ORAL
Refills: 0 | DISCHARGE

## 2023-06-06 RX ORDER — ROSUVASTATIN CALCIUM 5 MG/1
1 TABLET ORAL
Refills: 0 | DISCHARGE

## 2023-06-06 RX ORDER — OXYCODONE AND ACETAMINOPHEN 5; 325 MG/1; MG/1
1 TABLET ORAL
Qty: 6 | Refills: 0
Start: 2023-06-06 | End: 2023-06-07

## 2023-06-06 RX ORDER — MIRABEGRON 50 MG/1
1 TABLET, EXTENDED RELEASE ORAL
Refills: 0 | DISCHARGE

## 2023-06-06 RX ORDER — MULTIVIT-MIN/FERROUS GLUCONATE 9 MG/15 ML
1 LIQUID (ML) ORAL
Refills: 0 | DISCHARGE

## 2023-06-06 RX ORDER — ASPIRIN/CALCIUM CARB/MAGNESIUM 324 MG
1 TABLET ORAL
Refills: 0 | DISCHARGE

## 2023-06-06 NOTE — ASU DISCHARGE PLAN (ADULT/PEDIATRIC) - NURSING INSTRUCTIONS
Last dose of TYLENOL for pain management was at 10:15 AM. Next dose of TYLENOL may be taken at or after 4:15 PM if needed. DO NOT take any additional products containing TYLENOL or ACETAMINOPHEN, such as VICODIN, PERCOCET, NORCO, EXCEDRIN, and any over-the-counter cold medications. DO NOT CONSUME MORE THAN 3722-6003 MG OF TYLENOL (acetaminophen) in a 24-hour period.

## 2023-06-06 NOTE — ASU DISCHARGE PLAN (ADULT/PEDIATRIC) - CARE PROVIDER_API CALL
Rashi Moss (MD)  ColonRectal Surgery; Plastic Surgery; Surgery; Surgery of the Hand  [address], [facility] [address]  [place] [address]  Phone: [phone]  Fax: [phone]  Follow Up Time: 1 week

## 2023-06-06 NOTE — BRIEF OPERATIVE NOTE - NSICDXBRIEFPROCEDURE_GEN_ALL_CORE_FT
PROCEDURES:  Excision of malignant lesion of foot 2.1 to 2.5cm in diameter, including margins 06-Jun-2023 11:29:17  Rubén Roy  Application of full-thickness skin graft (FTSG) to lower leg 06-Jun-2023 11:29:35  Rubén Roy

## 2023-06-06 NOTE — BRIEF OPERATIVE NOTE - OPERATION/FINDINGS
w.e. of 0.2 mm melanoma of outer right foot, with plastics closure WE of 0.2 mm melanoma of outer right foot, with plastics closure with FTSG from L groin

## 2023-06-06 NOTE — ASU DISCHARGE PLAN (ADULT/PEDIATRIC) - ASU DC SPECIAL INSTRUCTIONSFT
Initial followup with plastic surgery in the next 5-15 days, regarding matters of bandages, activities, and showering.    Dr. Galeas should call with pathology report in approximately 2 weeks.  The conversation will determine further management. Please see Dr. Moss in 1-2 weeks for a follow up appointment.    You may shower tomorrow and get the groin area wet. Do not scrub. The right foot dressing must stay completely dry. Cover the outer dressing well with plastic wrap and tape when showering.    Take medications as prescribed including the antibiotic and pain medicine.    Dr. Galeas should call with pathology report in approximately 2 weeks.  The conversation will determine further management.

## 2023-06-06 NOTE — ASU DISCHARGE PLAN (ADULT/PEDIATRIC) - PROCEDURE
excision of right foot melanoma with plastics closure excision of right foot melanoma with plastics closure with FTSG

## 2023-06-07 ENCOUNTER — NON-APPOINTMENT (OUTPATIENT)
Age: 68
End: 2023-06-07

## 2023-06-07 ENCOUNTER — TRANSCRIPTION ENCOUNTER (OUTPATIENT)
Age: 68
End: 2023-06-07

## 2023-06-15 ENCOUNTER — APPOINTMENT (OUTPATIENT)
Dept: PLASTIC SURGERY | Facility: CLINIC | Age: 68
End: 2023-06-15
Payer: MEDICARE

## 2023-06-15 VITALS
BODY MASS INDEX: 23.62 KG/M2 | DIASTOLIC BLOOD PRESSURE: 75 MMHG | TEMPERATURE: 97.2 F | HEIGHT: 63.5 IN | SYSTOLIC BLOOD PRESSURE: 113 MMHG | OXYGEN SATURATION: 97 % | WEIGHT: 135 LBS | HEART RATE: 72 BPM

## 2023-06-15 PROBLEM — E78.5 HYPERLIPIDEMIA, UNSPECIFIED: Chronic | Status: ACTIVE | Noted: 2023-05-22

## 2023-06-15 PROBLEM — H40.9 UNSPECIFIED GLAUCOMA: Chronic | Status: ACTIVE | Noted: 2023-05-22

## 2023-06-15 PROBLEM — K21.9 GASTRO-ESOPHAGEAL REFLUX DISEASE WITHOUT ESOPHAGITIS: Chronic | Status: ACTIVE | Noted: 2023-05-22

## 2023-06-15 PROBLEM — N81.10 CYSTOCELE, UNSPECIFIED: Chronic | Status: ACTIVE | Noted: 2023-05-22

## 2023-06-15 PROBLEM — N32.81 OVERACTIVE BLADDER: Chronic | Status: ACTIVE | Noted: 2023-05-22

## 2023-06-15 LAB — SURGICAL PATHOLOGY STUDY: SIGNIFICANT CHANGE UP

## 2023-06-15 PROCEDURE — 99024 POSTOP FOLLOW-UP VISIT: CPT

## 2023-06-19 NOTE — REASON FOR VISIT
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

## 2023-06-19 NOTE — ASSESSMENT
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.\par \par We discussed the oncologic concerns, operative approach, risk, benefits, alternatives, possible surgical outcomes previously on the phone, and again today.\par \par Operation will be scheduled with plastic surgery as an outpatient procedure.\par Paperwork for scheduling was submitted electronically last week.\par \par Reviewed in detail, all questions answered.\par \par Note dictated to her referring physicians.\par \par \par 06/19/2023:\par Summary note.\par Our internal review of her original pathology slides UPSTAGED her to stage I (0.2 mm melanoma).\par \par June 6, 2023, she had wide excision of her 0.2 mm melanoma from the outer right foot.\par Plastics: Dr. Rashi Moss.\par Surgical pathology:\par No residual melanoma.\par + Scar from previous biopsy.\par Negative margins.\par \par Presently, no further intervention is warranted.\par \par I advised her to see the dermatologist every 3 to 4 months and I have asked to see her this winter, sooner if needed.\par \par Note dictated to her referring physicians.\par \par \par

## 2023-06-19 NOTE — HISTORY OF PRESENT ILLNESS
[de-identified] : 67-year-old lady.\par \par Referred by her cousin (oncology) Dr. Matthew LO.\par \par Newly diagnosed melanoma in situ of her RIGHT FOOT (lateral).\par \par Biopsy was performed by dermatology (Dr. Joseph RED).\par \par This was an asymptomatic pigmented nodule identified on a dermatologic examination.\par The visit prior to the recent one was in August 2022.\par \par \par + Prior personal history:\par ~2018 Mohs procedure for basal cell carcinoma of the right temple.\par ~2020: Excision of a basal cell carcinoma from the medial left calf.\par \par No other personal history of malignancy.\par \par \par No relatives with skin cancer.\par \par + Family history of malignancy:\par Mother: Bladder cancer\par \par \par Derm: Dr. Joseph RED.\par \par \par PMD: Dr. Jareth PAUL\par \par \par NKDA.\par \par \par No pacemaker or defibrillator.\par No anticoagulants.\par \par +81 mg aspirin daily.\par \par She takes Crestor for hyper cholesterolemia.\par \par Cardiology: Dr. Franklin BUTCHER in [place].\par \par + Endoscopically documented GERD.\par She takes Prevacid.\par \par \par + Cystocele and rectocele.\par She has seen urogynecology (Dr. Basil ESTRELLA).\par She takes Myrbetriq.\par This is prescribed by a gynecologist in [place] (Dr. Javier Love who performed a ALEXA/BSO for benign disease in 2019.\par \par December 2022:\par Left acetabular fracture.\par Treated nonoperatively.\par Orthopedics: Dr. Saul REILLY\par \par + History of retinal tears ~2012.\par Ophthalmology: Retina specialist: Dr. Galindo FERRIS.\par Glaucoma specialist: Dr. Nacho STEPHENSON.\par She used to see Dr. Eduin Godoy\par + Elevated intraocular pressure.\par She uses Timoptic eyedrops.\par + History of cataract surgery.\par \par \par GYN: Dr. Hugo STALLINGS.\par March 2023 visit was unremarkable.\par 2019 she had ALEXA/BSO for benign disease.\par \par Breast imaging is at .\par August 2022 studies were unremarkable.\par \par \par Follow-up colonoscopy is scheduled with Dr. Cl CLARK in the summer 2023

## 2023-06-19 NOTE — REVIEW OF SYSTEMS
[Negative] : Endocrine [FreeTextEntry3] : History of cataracts [FreeTextEntry5] : Hypercholesterolemia [de-identified] : History of hip fracture [de-identified] : Melanoma

## 2023-06-22 ENCOUNTER — APPOINTMENT (OUTPATIENT)
Dept: PLASTIC SURGERY | Facility: CLINIC | Age: 68
End: 2023-06-22
Payer: MEDICARE

## 2023-06-22 VITALS
TEMPERATURE: 96.6 F | SYSTOLIC BLOOD PRESSURE: 131 MMHG | WEIGHT: 135 LBS | BODY MASS INDEX: 23.92 KG/M2 | OXYGEN SATURATION: 99 % | HEIGHT: 63 IN | HEART RATE: 88 BPM | DIASTOLIC BLOOD PRESSURE: 75 MMHG

## 2023-06-22 PROCEDURE — 99024 POSTOP FOLLOW-UP VISIT: CPT

## 2023-06-29 ENCOUNTER — APPOINTMENT (OUTPATIENT)
Dept: PLASTIC SURGERY | Facility: CLINIC | Age: 68
End: 2023-06-29
Payer: MEDICARE

## 2023-06-29 VITALS
RESPIRATION RATE: 16 BRPM | OXYGEN SATURATION: 98 % | DIASTOLIC BLOOD PRESSURE: 80 MMHG | HEIGHT: 63 IN | SYSTOLIC BLOOD PRESSURE: 121 MMHG | WEIGHT: 135 LBS | HEART RATE: 82 BPM | TEMPERATURE: 97.6 F | BODY MASS INDEX: 23.92 KG/M2

## 2023-06-29 PROCEDURE — 99024 POSTOP FOLLOW-UP VISIT: CPT

## 2023-07-13 ENCOUNTER — APPOINTMENT (OUTPATIENT)
Dept: PLASTIC SURGERY | Facility: CLINIC | Age: 68
End: 2023-07-13
Payer: MEDICARE

## 2023-07-13 VITALS
DIASTOLIC BLOOD PRESSURE: 77 MMHG | SYSTOLIC BLOOD PRESSURE: 119 MMHG | BODY MASS INDEX: 23.92 KG/M2 | HEIGHT: 63 IN | OXYGEN SATURATION: 99 % | WEIGHT: 135 LBS | HEART RATE: 75 BPM | TEMPERATURE: 97.4 F

## 2023-07-13 PROCEDURE — 99024 POSTOP FOLLOW-UP VISIT: CPT

## 2023-07-20 ENCOUNTER — OUTPATIENT (OUTPATIENT)
Dept: OUTPATIENT SERVICES | Facility: HOSPITAL | Age: 68
LOS: 1 days | End: 2023-07-20
Payer: MEDICARE

## 2023-07-20 ENCOUNTER — APPOINTMENT (OUTPATIENT)
Dept: WOUND CARE | Facility: CLINIC | Age: 68
End: 2023-07-20
Payer: MEDICARE

## 2023-07-20 VITALS
HEART RATE: 71 BPM | DIASTOLIC BLOOD PRESSURE: 80 MMHG | WEIGHT: 135 LBS | SYSTOLIC BLOOD PRESSURE: 136 MMHG | HEIGHT: 63.5 IN | BODY MASS INDEX: 23.62 KG/M2

## 2023-07-20 DIAGNOSIS — Z98.890 OTHER SPECIFIED POSTPROCEDURAL STATES: Chronic | ICD-10-CM

## 2023-07-20 DIAGNOSIS — Z98.49 CATARACT EXTRACTION STATUS, UNSPECIFIED EYE: Chronic | ICD-10-CM

## 2023-07-20 DIAGNOSIS — Z90.710 ACQUIRED ABSENCE OF BOTH CERVIX AND UTERUS: Chronic | ICD-10-CM

## 2023-07-20 PROCEDURE — 11042 DBRDMT SUBQ TIS 1ST 20SQCM/<: CPT

## 2023-07-20 PROCEDURE — 99204 OFFICE O/P NEW MOD 45 MIN: CPT | Mod: 25

## 2023-07-20 RX ORDER — COLLAGENASE SANTYL 250 [ARB'U]/G
250 OINTMENT TOPICAL DAILY
Qty: 1 | Refills: 1 | Status: ACTIVE | COMMUNITY
Start: 2023-07-20 | End: 1900-01-01

## 2023-07-20 NOTE — PHYSICAL EXAM
[Please See PDF for Tissue Analytics] : Please See PDF for [employer] Analytics. [Ankle Swelling (On Exam)] : not present [Varicose Veins Of Lower Extremities] : not present [] : not present [de-identified] : Right lateral rearfoot wound to subQ with fibrotic base, mild periwound erythema, no tracking/tunneling, no malodor, no drainage, no signs of infection. Left foot with no open wounds or signs of infection.

## 2023-07-20 NOTE — ASSESSMENT
[FreeTextEntry1] : 67 y.o F w/ right lateral rearfoot wound to subQ s/p Melanoma excision w/ Dr. Galeas 6/16/23\par - Pt seen and evaluated\par - Right lateral rearfoot wound to subQ with fibrotic base, mild periwound erythema, no tracking/tunneling, no malodor, no drainage, no signs of infection. Left foot with no open wounds or signs of infection. \par - Excisional debridement using sterile suture removal kit of the right heel wound was performed bedside. Pt tolerated the procedure well. \par - Instructed patient to discontinue using wet-to-dry dressings, and to start using Santyl\par - Rx Santyl to be applied over the right foot wound \par - Consider Puraply application next visit pending insurance approval \par - Pt to return in 2 weeks or sooner if needed

## 2023-07-20 NOTE — HISTORY OF PRESENT ILLNESS
[FreeTextEntry1] : 67 year old female who presents for s/p wide excision of right foot melanoma and reconstruction  DOS: 6/6/23 w/ Dr. Galeas and Dr. Moss . Pt was referred by Dr. Moss for continued with ongoing pain at her surgical site and she's been experiencing delayed wound healing. She also notes some redness surrounding her surgical site. Pt has otherwise been gently massaging her skin graft with lotion daily and has been keeping the area covered and cushioned with gauze and applying aquafore throughout the day. Otherwise no other complaints or concerns; denies fever, sweats, or chills.

## 2023-07-25 DIAGNOSIS — L97.512 NON-PRESSURE CHRONIC ULCER OF OTHER PART OF RIGHT FOOT WITH FAT LAYER EXPOSED: ICD-10-CM

## 2023-07-25 DIAGNOSIS — Z86.006 PERSONAL HISTORY OF MELANOMA IN-SITU: ICD-10-CM

## 2023-07-26 ENCOUNTER — APPOINTMENT (OUTPATIENT)
Dept: ORTHOPEDIC SURGERY | Facility: CLINIC | Age: 68
End: 2023-07-26
Payer: MEDICARE

## 2023-07-26 VITALS — WEIGHT: 135 LBS | HEIGHT: 63 IN | BODY MASS INDEX: 23.92 KG/M2

## 2023-07-26 PROCEDURE — 20611 DRAIN/INJ JOINT/BURSA W/US: CPT | Mod: RT

## 2023-07-26 PROCEDURE — 99213 OFFICE O/P EST LOW 20 MIN: CPT | Mod: 25

## 2023-07-26 PROCEDURE — 73564 X-RAY EXAM KNEE 4 OR MORE: CPT | Mod: RT

## 2023-07-26 NOTE — PHYSICAL EXAM
[5___] : hamstring 5[unfilled]/5 [Equivocal] : equivocal Leah [] : negative Lachmann [Right] : right knee [All Views] : anteroposterior, lateral, skyline, and anteroposterior standing [Moderate tricompartmental OA medial narrowing] : Moderate tricompartmental OA medial narrowing [FreeTextEntry9] : inc from 2021 [TWNoteComboBox7] : flexion 120 degrees [de-identified] : extension 0 degrees

## 2023-07-26 NOTE — PROCEDURE
[FreeTextEntry3] : Orthovisc (Large Joint) with Ultrasound Guidance\par Viscosupplementation Injection: X-ray evidence of Osteoarthritis on this or prior visit and Patient has tried OTC's including aspirin, Ibuprofen, Aleve etc or prescription NSAIDS, and/or exercises at home and/ or physical therapy without satisfactory response. \par An injection of Orthovisc 2ml #1 was injected into the right knee(s). after verbal consent using sterile technique. The risks, benefits, and alternatives to Viscosupplementation injection were explained in full to the patient. Risks outlined include but are not limited to infection, sepsis, bleeding, scarring, skin discoloration, temporary increase in pain, syncopal episode, failure to resolve symptoms, allergic reaction, and symptom recurrence. Signs and symptoms of infection reviewed and patient advised to call immediately for redness, fevers, and/or chills. Patient understood the risks. All questions were answered. After discussion of options, patient requested Viscosupplementation. Oral informed consent was obtained and sterile prep was done of the injection site. Sterile technique was used without complications. The patient tolerated the procedure well. Ice tonight to the injection site. \par \par Ultrasound Guidance was used for the following reasons: prior failure or difficult injection. \par \par Ultrasound guided injection was performed of the knee, visualization of the needle and placement of injection was performed without complication. \par \par

## 2023-07-26 NOTE — HISTORY OF PRESENT ILLNESS
[8] : 8 [Dull/Aching] : dull/aching [Sharp] : sharp [Throbbing] : throbbing [Tightness] : tightness [4] : 4 [Orthovisc] : Orthovisc [de-identified] : Patient returns for fu R knee. Pain has started to return.  SHe had recent skin surgery on her right lower leg for melanoma.Had HA over year ago and helped nad had stiffness, PT helped [] : Post Surgical Visit: no [FreeTextEntry1] : right knee [de-identified] : 07/26/2022 [de-identified] : right knee

## 2023-07-26 NOTE — DISCUSSION/SUMMARY
[de-identified] : Patient allowed to gently start resuming activities.\par Discussed change to medication prescription and usage. \par Offered cortisone steroid injection. \par Bracing options discussed with patient. \par Hyaluronic Acid inj pamphlet given to pt.\par 07/26/2023 \par RE:  [facility] \par \par Acct #- [id number] \par Attention:  Nurse Reviewer /Medical Director\par \par I am writing this letter as a medical necessity for HA orthovisc R knee\par Patient has tried analgesics, non-steroid anti-inflammatory agents, \par physical therapy, hot or cold compresses,injections of corticosteroids, etc)  which in combination or by themselves has not worked.\par Based on my patient's condition, I strongly believe that the Hyaluronic aid injections is medically needed.\par  \par Thank you for your time and consideration.   \par 07/26/2023 \par \par  RE:  YI LO \par \par Acct #- [id number] \par \par \par Attention:  Nurse Reviewer /Medical Director\par \par I am writing this letter as a medical necessity for PT program.\par Patient has tried analgesics, non-steroid anti-inflammatory agents, \par hot or cold compresses,injections of corticosteroids, etc)  which in combination or by themselves has not worked.\par Based on my patient's condition, I strongly believe that the PT is medically needed.\par  \par Thank you for your time and consideration.   \par \par \par

## 2023-07-27 ENCOUNTER — APPOINTMENT (OUTPATIENT)
Dept: PLASTIC SURGERY | Facility: CLINIC | Age: 68
End: 2023-07-27
Payer: MEDICARE

## 2023-07-27 VITALS
SYSTOLIC BLOOD PRESSURE: 110 MMHG | HEART RATE: 73 BPM | OXYGEN SATURATION: 95 % | HEIGHT: 63 IN | BODY MASS INDEX: 23.92 KG/M2 | WEIGHT: 135 LBS | DIASTOLIC BLOOD PRESSURE: 77 MMHG | TEMPERATURE: 97.3 F

## 2023-07-27 PROCEDURE — 99024 POSTOP FOLLOW-UP VISIT: CPT

## 2023-08-02 ENCOUNTER — APPOINTMENT (OUTPATIENT)
Dept: ORTHOPEDIC SURGERY | Facility: CLINIC | Age: 68
End: 2023-08-02
Payer: MEDICARE

## 2023-08-02 PROCEDURE — 20611 DRAIN/INJ JOINT/BURSA W/US: CPT | Mod: RT

## 2023-08-02 PROCEDURE — 99024 POSTOP FOLLOW-UP VISIT: CPT

## 2023-08-02 NOTE — PROCEDURE
[FreeTextEntry3] : Orthovisc (Large Joint) with Ultrasound Guidance Viscosupplementation Injection: X-ray evidence of Osteoarthritis on this or prior visit and Patient has tried OTC's including aspirin, Ibuprofen, Aleve etc or prescription NSAIDS, and/or exercises at home and/ or physical therapy without satisfactory response.  An injection of Orthovisc 2ml #2 was injected into the right knee(s). after verbal consent using sterile technique. The risks, benefits, and alternatives to Viscosupplementation injection were explained in full to the patient. Risks outlined include but are not limited to infection, sepsis, bleeding, scarring, skin discoloration, temporary increase in pain, syncopal episode, failure to resolve symptoms, allergic reaction, and symptom recurrence. Signs and symptoms of infection reviewed and patient advised to call immediately for redness, fevers, and/or chills. Patient understood the risks. All questions were answered. After discussion of options, patient requested Viscosupplementation. Oral informed consent was obtained and sterile prep was done of the injection site. Sterile technique was used without complications. The patient tolerated the procedure well. Ice tonight to the injection site.   Ultrasound Guidance was used for the following reasons: prior failure or difficult injection.   Ultrasound guided injection was performed of the knee, visualization of the needle and placement of injection was performed without complication.

## 2023-08-02 NOTE — PHYSICAL EXAM
[Right] : right knee [5___] : hamstring 5[unfilled]/5 [Equivocal] : equivocal Leah [] : negative Lachmann [[id number]] : flexion 120 degrees [de-identified] : extension 0 degrees

## 2023-08-02 NOTE — HISTORY OF PRESENT ILLNESS
[2] : 2 [Orthovisc] : Orthovisc [de-identified] : Patient returns for Orthovisc #2 right knee, tolerated the first injection well.  [] : no [de-identified] : 07/26/2023 [de-identified] : right knee

## 2023-08-03 ENCOUNTER — OUTPATIENT (OUTPATIENT)
Dept: OUTPATIENT SERVICES | Facility: HOSPITAL | Age: 68
LOS: 1 days | End: 2023-08-03
Payer: MEDICARE

## 2023-08-03 ENCOUNTER — APPOINTMENT (OUTPATIENT)
Dept: WOUND CARE | Facility: CLINIC | Age: 68
End: 2023-08-03
Payer: MEDICARE

## 2023-08-03 DIAGNOSIS — Z98.49 CATARACT EXTRACTION STATUS, UNSPECIFIED EYE: Chronic | ICD-10-CM

## 2023-08-03 DIAGNOSIS — Z98.890 OTHER SPECIFIED POSTPROCEDURAL STATES: Chronic | ICD-10-CM

## 2023-08-03 PROCEDURE — 11042 DBRDMT SUBQ TIS 1ST 20SQCM/<: CPT

## 2023-08-03 PROCEDURE — 15275 SKIN SUB GRAFT FACE/NK/HF/G: CPT

## 2023-08-03 NOTE — ASSESSMENT
[FreeTextEntry1] : 67 y.o F w/ right lateral rearfoot wound to subQ s/p Melanoma excision w/ Dr. Galeas 6/16/23 - Pt seen and evaluated - Right lateral rearfoot wound to subQ with fibrotic base, no tracking/tunneling, no malodor, no drainage, no signs of infection, decreased in size from last week. Left foot with no open wounds or signs of infection.  - Excisional debridement using sterile suture removal kit of the right heel wound was performed bedside. Pt tolerated the procedure well.  - puraply applied to right foot wound w/ DSD, pt instructed to remove outer layer of dressing only -RTC 1 week

## 2023-08-03 NOTE — PHYSICAL EXAM
[Please See PDF for Tissue Analytics] : Please See PDF for [employer] Analytics. [Ankle Swelling (On Exam)] : not present [Varicose Veins Of Lower Extremities] : not present [] : not present [de-identified] : Right lateral rearfoot wound to subQ with fibrotic base, mild periwound erythema, no tracking/tunneling, no malodor, no drainage, no signs of infection. Left foot with no open wounds or signs of infection.

## 2023-08-09 ENCOUNTER — APPOINTMENT (OUTPATIENT)
Dept: ORTHOPEDIC SURGERY | Facility: CLINIC | Age: 68
End: 2023-08-09
Payer: MEDICARE

## 2023-08-09 VITALS — WEIGHT: 135 LBS | BODY MASS INDEX: 23.92 KG/M2 | HEIGHT: 63 IN

## 2023-08-09 DIAGNOSIS — L97.512 NON-PRESSURE CHRONIC ULCER OF OTHER PART OF RIGHT FOOT WITH FAT LAYER EXPOSED: ICD-10-CM

## 2023-08-09 DIAGNOSIS — Z86.006 PERSONAL HISTORY OF MELANOMA IN-SITU: ICD-10-CM

## 2023-08-09 PROCEDURE — 20611 DRAIN/INJ JOINT/BURSA W/US: CPT | Mod: RT

## 2023-08-09 PROCEDURE — 99024 POSTOP FOLLOW-UP VISIT: CPT

## 2023-08-09 NOTE — HISTORY OF PRESENT ILLNESS
[de-identified] : Patient returns for Orthovisc #3 right knee, tolerated the first injection well.

## 2023-08-09 NOTE — PROCEDURE
[FreeTextEntry3] : Orthovisc (Large Joint) with Ultrasound Guidance Viscosupplementation Injection: X-ray evidence of Osteoarthritis on this or prior visit and Patient has tried OTC's including aspirin, Ibuprofen, Aleve etc or prescription NSAIDS, and/or exercises at home and/ or physical therapy without satisfactory response.  An injection of Orthovisc 2ml #3 was injected into the right knee(s). after verbal consent using sterile technique. The risks, benefits, and alternatives to Viscosupplementation injection were explained in full to the patient. Risks outlined include but are not limited to infection, sepsis, bleeding, scarring, skin discoloration, temporary increase in pain, syncopal episode, failure to resolve symptoms, allergic reaction, and symptom recurrence. Signs and symptoms of infection reviewed and patient advised to call immediately for redness, fevers, and/or chills. Patient understood the risks. All questions were answered. After discussion of options, patient requested Viscosupplementation. Oral informed consent was obtained and sterile prep was done of the injection site. Sterile technique was used without complications. The patient tolerated the procedure well. Ice tonight to the injection site.   Ultrasound Guidance was used for the following reasons: prior failure or difficult injection.   Ultrasound guided injection was performed of the knee, visualization of the needle and placement of injection was performed without complication.

## 2023-08-09 NOTE — PHYSICAL EXAM
[Right] : right knee [5___] : hamstring 5[unfilled]/5 [Equivocal] : equivocal Leah [] : negative Lachmann [[id number]] : flexion 120 degrees [de-identified] : extension 0 degrees

## 2023-08-10 ENCOUNTER — APPOINTMENT (OUTPATIENT)
Dept: PLASTIC SURGERY | Facility: CLINIC | Age: 68
End: 2023-08-10

## 2023-08-10 ENCOUNTER — APPOINTMENT (OUTPATIENT)
Dept: WOUND CARE | Facility: CLINIC | Age: 68
End: 2023-08-10
Payer: MEDICARE

## 2023-08-10 VITALS — TEMPERATURE: 97.6 F

## 2023-08-10 DIAGNOSIS — Z86.006 PERSONAL HISTORY OF MELANOMA IN-SITU: ICD-10-CM

## 2023-08-10 PROCEDURE — 99213 OFFICE O/P EST LOW 20 MIN: CPT

## 2023-08-10 NOTE — HISTORY OF PRESENT ILLNESS
[FreeTextEntry1] : 67 year old female who presents for s/p wide excision of right foot melanoma and reconstruction  DOS: 6/6/23 w/ Dr. Galeas and Dr. Moss . Pt was referred by Dr. Moss for continued with ongoing pain at her surgical site and she's been experiencing delayed wound healing. She also notes some redness surrounding her surgical site. Patient has limited mobility and ambulation per Dr BEJARANO's instructions. Patient states the wound didn't ooze and she hasn't changed the dressing since the last visit.  Otherwise no other complaints or concerns; denies fever, sweats, or chills.

## 2023-08-10 NOTE — PHYSICAL EXAM
[Please See PDF for Tissue Analytics] : Please See PDF for [employer] Analytics. [Ankle Swelling (On Exam)] : not present [Varicose Veins Of Lower Extremities] : not present [] : not present [de-identified] : Right lateral rearfoot wound to subQ with fibrotic base, mild periwound erythema, no tracking/tunneling, no malodor, no drainage, no signs of infection. Left foot with no open wounds or signs of infection.

## 2023-08-10 NOTE — ASSESSMENT
[FreeTextEntry1] : 67 y.o F w/ right lateral rearfoot wound to subQ s/p Melanoma excision w/ Dr. Galeas 6/16/23, s/p puraply # 1 application on 8/3 - Pt seen and evaluated - Right lateral rearfoot wound to subQ with firotic cap, no tracking/tunneling, no malodor, no drainage, no signs of infection, decreased in size from last week. Left foot with no open wounds or signs of infection.  - Applied acticoat # 3, allevyn, and hypafix - Instructed patient to leave dressing clean dry and intact -RTC 1 week

## 2023-08-15 ENCOUNTER — APPOINTMENT (OUTPATIENT)
Dept: ORTHOPEDIC SURGERY | Facility: CLINIC | Age: 68
End: 2023-08-15
Payer: MEDICARE

## 2023-08-15 DIAGNOSIS — M17.11 UNILATERAL PRIMARY OSTEOARTHRITIS, RIGHT KNEE: ICD-10-CM

## 2023-08-15 PROCEDURE — 20611 DRAIN/INJ JOINT/BURSA W/US: CPT | Mod: RT

## 2023-08-15 NOTE — PHYSICAL EXAM
[Right] : right knee [5___] : hamstring 5[unfilled]/5 [Equivocal] : equivocal Leah [] : negative Lachmann [[id number]] : flexion 120 degrees [de-identified] : extension 0 degrees

## 2023-08-15 NOTE — HISTORY OF PRESENT ILLNESS
[4] : 4 [Orthovisc] : Orthovisc [de-identified] : Patient returns for Orthovisc #4 right knee, tolerated the first injection well.  [] : no [de-identified] : 08/09/2023 [de-identified] : right knee

## 2023-08-15 NOTE — PROCEDURE
[FreeTextEntry3] : Orthovisc (Large Joint) with Ultrasound Guidance Viscosupplementation Injection: X-ray evidence of Osteoarthritis on this or prior visit and Patient has tried OTC's including aspirin, Ibuprofen, Aleve etc or prescription NSAIDS, and/or exercises at home and/ or physical therapy without satisfactory response.  An injection of Orthovisc 2ml #4 was injected into the right knee(s). after verbal consent using sterile technique. The risks, benefits, and alternatives to Viscosupplementation injection were explained in full to the patient. Risks outlined include but are not limited to infection, sepsis, bleeding, scarring, skin discoloration, temporary increase in pain, syncopal episode, failure to resolve symptoms, allergic reaction, and symptom recurrence. Signs and symptoms of infection reviewed and patient advised to call immediately for redness, fevers, and/or chills. Patient understood the risks. All questions were answered. After discussion of options, patient requested Viscosupplementation. Oral informed consent was obtained and sterile prep was done of the injection site. Sterile technique was used without complications. The patient tolerated the procedure well. Ice tonight to the injection site.   Ultrasound Guidance was used for the following reasons: prior failure or difficult injection.   Ultrasound guided injection was performed of the knee, visualization of the needle and placement of injection was performed without complication.

## 2023-08-17 ENCOUNTER — APPOINTMENT (OUTPATIENT)
Dept: WOUND CARE | Facility: CLINIC | Age: 68
End: 2023-08-17
Payer: MEDICARE

## 2023-08-17 ENCOUNTER — OUTPATIENT (OUTPATIENT)
Dept: OUTPATIENT SERVICES | Facility: HOSPITAL | Age: 68
LOS: 1 days | End: 2023-08-17
Payer: MEDICARE

## 2023-08-17 VITALS
HEART RATE: 73 BPM | TEMPERATURE: 97.4 F | RESPIRATION RATE: 12 BRPM | OXYGEN SATURATION: 99 % | SYSTOLIC BLOOD PRESSURE: 123 MMHG | DIASTOLIC BLOOD PRESSURE: 81 MMHG

## 2023-08-17 DIAGNOSIS — Z98.890 OTHER SPECIFIED POSTPROCEDURAL STATES: Chronic | ICD-10-CM

## 2023-08-17 DIAGNOSIS — Z98.49 CATARACT EXTRACTION STATUS, UNSPECIFIED EYE: Chronic | ICD-10-CM

## 2023-08-17 DIAGNOSIS — Z90.710 ACQUIRED ABSENCE OF BOTH CERVIX AND UTERUS: Chronic | ICD-10-CM

## 2023-08-17 PROCEDURE — 97597 DBRDMT OPN WND 1ST 20 CM/<: CPT

## 2023-08-17 NOTE — ASSESSMENT
[FreeTextEntry1] : 67 y.o F w/ right lateral rearfoot wound to subQ s/p Melanoma excision w/ Dr. Galeas 6/16/23, s/p puraply # 1 application on 8/3 - Pt seen and evaluated - Right lateral rearfoot wound w/ fibrotic cap, no tracking/tunneling, no malodor, no drainage, no signs of infection. Left foot with no open wounds or signs of infection.  - Right foot rearfoot fibrotic cap removed using sterile suture removal kit (partial thickness debridement), no underlying wound was present under the fibrotic cap - wound appears the have completely healed.  - Gentian violet ointment applied to the right rearfoot  - Dermafoam applied over the right rear foot for additional support  - Patient can now resume showering the right foot and no further dressing is needed over the right rearfoot healed wound  - RTC in 1 week or sooner if needed

## 2023-08-17 NOTE — PHYSICAL EXAM
[Please See PDF for Tissue Analytics] : Please See PDF for [employer] Analytics. [Ankle Swelling (On Exam)] : not present [Varicose Veins Of Lower Extremities] : not present [] : not present [de-identified] : Right lateral rearfoot wound to subQ with fibrotic base, mild periwound erythema, no tracking/tunneling, no malodor, no drainage, no signs of infection. Left foot with no open wounds or signs of infection.

## 2023-08-24 ENCOUNTER — APPOINTMENT (OUTPATIENT)
Dept: WOUND CARE | Facility: CLINIC | Age: 68
End: 2023-08-24
Payer: MEDICARE

## 2023-08-24 PROCEDURE — 99213 OFFICE O/P EST LOW 20 MIN: CPT

## 2023-08-24 NOTE — HISTORY OF PRESENT ILLNESS
[FreeTextEntry1] : 67F presents s/p wide excision of right foot melanoma and reconstruction. DOS: 6/6/23 w/ Dr. Galeas and Dr. Moss. Pt was referred by Dr. Moss for continued ongoing pain at her surgical site and she's been experiencing delayed wound healing. She also notes some redness surrounding her surgical site. Pt has limited mobility and ambulation per Dr Caruso's instructions. Patient states the wound didn't ooze and she hasn't changed the dressing since the last visit. Pt hardly walks and has been complaint. She began showering with the wound exposed per Dr. Caruso's instructions and applied Aquafour to the wound followed by Dermafoam daily after showering. Denies N/V/F/C/SOB.

## 2023-08-24 NOTE — ASSESSMENT
[FreeTextEntry1] : 67F with right lateral rearfoot wound to subq s/p Melanoma excision w/ Dr. Galeas 6/16/23, s/p puraply # 1 application on 8/3. - Pt seen and evaluated. - Right lateral rearfoot wound is healed with hyperkeratotic cap. Left foot no open wounds, no acute signs of infection.  - Used a sterile suture removal kit to debride the right foot wound down to the level of but not beyond epidermis. - Allevyn applied over the right rearfoot wound followed by hypafix and to be performed daily. - Pt to see plastics in 2 weeks and return to wound care in 4 weeks. - Pt is cleared to swim in a private pool but recommend avoiding beaches. - Recommend shoe gear that avoids excess pressure of the newly healed right foot wound. - RTC in 4 weeks.

## 2023-08-24 NOTE — PHYSICAL EXAM
[Please See PDF for Tissue Analytics] : Please See PDF for Tissue Analytics. [Ankle Swelling (On Exam)] : not present [Varicose Veins Of Lower Extremities] : not present [] : not present [de-identified] : Right lateral rearfoot wound is healed with hyperkeratotic cap. Left foot no open wounds, no acute signs of infection. [de-identified] : Right lateral rearfoot wound to subQ with fibrotic base, mild periwound erythema, no tracking/tunneling, no malodor, no drainage, no signs of infection. Left foot with no open wounds or signs of infection.

## 2023-09-06 ENCOUNTER — APPOINTMENT (OUTPATIENT)
Dept: PLASTIC SURGERY | Facility: CLINIC | Age: 68
End: 2023-09-06
Payer: MEDICARE

## 2023-09-06 VITALS
OXYGEN SATURATION: 96 % | TEMPERATURE: 98 F | SYSTOLIC BLOOD PRESSURE: 110 MMHG | HEIGHT: 63 IN | WEIGHT: 135 LBS | DIASTOLIC BLOOD PRESSURE: 75 MMHG | HEART RATE: 86 BPM | BODY MASS INDEX: 23.92 KG/M2

## 2023-09-06 PROCEDURE — 99212 OFFICE O/P EST SF 10 MIN: CPT

## 2023-09-07 ENCOUNTER — APPOINTMENT (OUTPATIENT)
Dept: WOUND CARE | Facility: CLINIC | Age: 68
End: 2023-09-07

## 2023-09-13 DIAGNOSIS — Z85.820 PERSONAL HISTORY OF MALIGNANT MELANOMA OF SKIN: ICD-10-CM

## 2023-09-13 DIAGNOSIS — Z98.890 OTHER SPECIFIED POSTPROCEDURAL STATES: ICD-10-CM

## 2023-09-13 DIAGNOSIS — L97.512 NON-PRESSURE CHRONIC ULCER OF OTHER PART OF RIGHT FOOT WITH FAT LAYER EXPOSED: ICD-10-CM

## 2023-09-14 ENCOUNTER — APPOINTMENT (OUTPATIENT)
Dept: WOUND CARE | Facility: CLINIC | Age: 68
End: 2023-09-14

## 2023-09-21 ENCOUNTER — OUTPATIENT (OUTPATIENT)
Dept: OUTPATIENT SERVICES | Facility: HOSPITAL | Age: 68
LOS: 1 days | End: 2023-09-21

## 2023-09-21 ENCOUNTER — APPOINTMENT (OUTPATIENT)
Dept: WOUND CARE | Facility: HOSPITAL | Age: 68
End: 2023-09-21
Payer: MEDICARE

## 2023-09-21 VITALS
HEIGHT: 64 IN | BODY MASS INDEX: 23.05 KG/M2 | WEIGHT: 135 LBS | HEART RATE: 82 BPM | DIASTOLIC BLOOD PRESSURE: 82 MMHG | SYSTOLIC BLOOD PRESSURE: 125 MMHG | OXYGEN SATURATION: 97 % | TEMPERATURE: 97.1 F

## 2023-09-21 DIAGNOSIS — Z98.890 OTHER SPECIFIED POSTPROCEDURAL STATES: Chronic | ICD-10-CM

## 2023-09-21 DIAGNOSIS — Z98.49 CATARACT EXTRACTION STATUS, UNSPECIFIED EYE: Chronic | ICD-10-CM

## 2023-09-21 DIAGNOSIS — L97.512 NON-PRESSURE CHRONIC ULCER OF OTHER PART OF RIGHT FOOT WITH FAT LAYER EXPOSED: ICD-10-CM

## 2023-09-21 DIAGNOSIS — Z98.890 OTHER SPECIFIED POSTPROCEDURAL STATES: ICD-10-CM

## 2023-09-21 DIAGNOSIS — Z90.710 ACQUIRED ABSENCE OF BOTH CERVIX AND UTERUS: Chronic | ICD-10-CM

## 2023-09-21 DIAGNOSIS — Z85.820 OTHER SPECIFIED POSTPROCEDURAL STATES: ICD-10-CM

## 2023-09-21 PROCEDURE — 99213 OFFICE O/P EST LOW 20 MIN: CPT

## 2023-09-21 NOTE — HISTORY OF PRESENT ILLNESS
[2] : 2 [Orthovisc] : Orthovisc [de-identified] : has known OA and for Orthovisc #2 [4] : 4 [] : no [FreeTextEntry1] : right knee [de-identified] : none [de-identified] : 07/13/2022 [de-identified] : right knee  Dupixent Pregnancy And Lactation Text: This medication likely crosses the placenta but the risk for the fetus is uncertain. This medication is excreted in breast milk.

## 2023-11-01 DIAGNOSIS — Z85.820 PERSONAL HISTORY OF MALIGNANT MELANOMA OF SKIN: ICD-10-CM

## 2023-11-01 DIAGNOSIS — L97.512 NON-PRESSURE CHRONIC ULCER OF OTHER PART OF RIGHT FOOT WITH FAT LAYER EXPOSED: ICD-10-CM

## 2023-11-01 DIAGNOSIS — Z98.890 OTHER SPECIFIED POSTPROCEDURAL STATES: ICD-10-CM

## 2023-11-01 DIAGNOSIS — C43.70 MALIGNANT MELANOMA OF UNSPECIFIED LOWER LIMB, INCLUDING HIP: ICD-10-CM

## 2023-12-10 PROBLEM — C43.70: Status: ACTIVE | Noted: 2023-04-30

## 2023-12-11 ENCOUNTER — APPOINTMENT (OUTPATIENT)
Dept: SURGICAL ONCOLOGY | Facility: CLINIC | Age: 68
End: 2023-12-11
Payer: MEDICARE

## 2023-12-11 VITALS
SYSTOLIC BLOOD PRESSURE: 115 MMHG | RESPIRATION RATE: 16 BRPM | BODY MASS INDEX: 23.05 KG/M2 | OXYGEN SATURATION: 97 % | HEART RATE: 82 BPM | WEIGHT: 135 LBS | DIASTOLIC BLOOD PRESSURE: 75 MMHG | HEIGHT: 64 IN

## 2023-12-11 DIAGNOSIS — C43.70 MALIGNANT MELANOMA OF UNSPECIFIED LOWER LIMB, INCLUDING HIP: ICD-10-CM

## 2023-12-11 PROCEDURE — 99214 OFFICE O/P EST MOD 30 MIN: CPT

## 2024-02-29 ENCOUNTER — APPOINTMENT (OUTPATIENT)
Dept: ORTHOPEDIC SURGERY | Facility: CLINIC | Age: 69
End: 2024-02-29
Payer: MEDICARE

## 2024-02-29 VITALS — WEIGHT: 135 LBS | BODY MASS INDEX: 23.05 KG/M2 | HEIGHT: 64 IN

## 2024-02-29 DIAGNOSIS — M25.422 EFFUSION, LEFT ELBOW: ICD-10-CM

## 2024-02-29 PROCEDURE — 99203 OFFICE O/P NEW LOW 30 MIN: CPT

## 2024-02-29 RX ORDER — FAMOTIDINE 40 MG/1
40 TABLET, FILM COATED ORAL
Refills: 0 | Status: ACTIVE | COMMUNITY

## 2024-02-29 RX ORDER — MIRABEGRON 50 MG/1
50 TABLET, FILM COATED, EXTENDED RELEASE ORAL
Refills: 0 | Status: ACTIVE | COMMUNITY

## 2024-02-29 RX ORDER — TIMOLOL MALEATE 5 MG/ML
0.5 SOLUTION OPHTHALMIC
Refills: 0 | Status: ACTIVE | COMMUNITY

## 2024-02-29 NOTE — HISTORY OF PRESENT ILLNESS
[Left Arm] : left arm [Sudden] : sudden [de-identified] : 2/29/2024: Patient is a RHD 68 year old female today complaining of a swollen left arm x 4 mos.  She saw her PCP who sent her for a doppler which is negative.  She also saw a rheumatologist (hx of OA- pt reports she had recent normal bw in regards to rheumatologic dz) who recommended Orthopedic consultation. There is no hx of trauma.  Pt has attempted course of nsaid and MDP with no resolution.  Pt has hx of wearing an elastic elbow band chronically "for years" due to having cubital tunnel.   PMH: Glaucoma,  Allergies: NKDA [] : no [de-identified] : pcp/rheum [de-identified] : mri/doppler

## 2024-02-29 NOTE — ASSESSMENT
[FreeTextEntry1] : The patient was advised of the diagnosis. The natural history of the pathology was explained in full to the patient in layman's terms. All questions were answered. The risks and benefits of surgical and non-surgical treatment alternatives were explained in full to the patient.  recommend discontinue use of elastic elbow cushion, as this is likely the cause of the swelling. Pt provided reassurance and she will rto prn.

## 2024-02-29 NOTE — IMAGING
[de-identified] : left shoulder and elbow with full and pain free rom there is no ligamentous laxity with valgus/varus stress. there is localized swelling to the proximal forearm only. There is no pitting noted. Proximal forearm is mildly ttp. There is no epitrochlear lymphadenopathy. There is no direct ttp over the medial / lateral epicondylar regions. no pain with wrist extension/flexion against resistance.  LUE is nvi with 5/5 strength.    xrays deferred.

## 2024-04-03 ENCOUNTER — APPOINTMENT (OUTPATIENT)
Dept: OBGYN | Facility: CLINIC | Age: 69
End: 2024-04-03
Payer: MEDICARE

## 2024-04-03 PROCEDURE — G0101: CPT | Mod: GA

## 2024-04-03 PROCEDURE — G0444 DEPRESSION SCREEN ANNUAL: CPT

## 2024-07-22 ENCOUNTER — APPOINTMENT (OUTPATIENT)
Dept: ORTHOPEDIC SURGERY | Facility: CLINIC | Age: 69
End: 2024-07-22
Payer: MEDICARE

## 2024-07-22 VITALS — WEIGHT: 135 LBS | HEIGHT: 64 IN | BODY MASS INDEX: 23.05 KG/M2

## 2024-07-22 PROCEDURE — 20611 DRAIN/INJ JOINT/BURSA W/US: CPT | Mod: RT

## 2024-07-22 PROCEDURE — 99213 OFFICE O/P EST LOW 20 MIN: CPT | Mod: 25

## 2024-07-22 NOTE — HISTORY OF PRESENT ILLNESS
[Gradual] : gradual [6] : 6 [5] : 5 [Dull/Aching] : dull/aching [Throbbing] : throbbing [Intermittent] : intermittent [Rest] : rest [Meds] : meds [Walking] : walking [Stairs] : stairs [4] : 4 [Orthovisc] : Orthovisc [] : yes

## 2024-07-29 ENCOUNTER — APPOINTMENT (OUTPATIENT)
Dept: ORTHOPEDIC SURGERY | Facility: CLINIC | Age: 69
End: 2024-07-29
Payer: MEDICARE

## 2024-07-29 VITALS — WEIGHT: 135 LBS | HEIGHT: 64 IN | BODY MASS INDEX: 23.05 KG/M2

## 2024-07-29 PROCEDURE — 20611 DRAIN/INJ JOINT/BURSA W/US: CPT | Mod: RT

## 2024-07-29 NOTE — HISTORY OF PRESENT ILLNESS
[Gradual] : gradual [6] : 6 [5] : 5 [Dull/Aching] : dull/aching [Throbbing] : throbbing [Intermittent] : intermittent [Rest] : rest [Meds] : meds [Walking] : walking [Stairs] : stairs [2] : 2 [Orthovisc] : Orthovisc [de-identified] : The right knee pain persists. Orthovisc injection #2 for the right knee. She reports no issues with the prior injections.  [] : no [FreeTextEntry1] : RT KNEE  [FreeTextEntry6] : stiffness  [de-identified] : 7/22/24 [de-identified] : right knee

## 2024-07-29 NOTE — PHYSICAL EXAM
[Right] : right knee [5___] : hamstring 5[unfilled]/5 [Negative] : negative Ruthy's [] : negative Lachmann [TWNoteComboBox7] : flexion 115 degrees [de-identified] : extension 0 degrees

## 2024-07-29 NOTE — DISCUSSION/SUMMARY
[de-identified] : rest, ice, activity modification.  Patient allowed to gently start resuming activities. return to the office to continue the series

## 2024-08-08 ENCOUNTER — APPOINTMENT (OUTPATIENT)
Dept: ORTHOPEDIC SURGERY | Facility: CLINIC | Age: 69
End: 2024-08-08

## 2024-08-08 PROCEDURE — 20611 DRAIN/INJ JOINT/BURSA W/US: CPT | Mod: RT

## 2024-08-08 NOTE — HISTORY OF PRESENT ILLNESS
[de-identified] : The right knee pain persists. Orthovisc injection #3 for the right knee. She reports no issues with the prior injections.

## 2024-08-08 NOTE — DISCUSSION/SUMMARY
[de-identified] : rest, ice, activity modification.  Patient allowed to gently start resuming activities. return to the office to continue the series

## 2024-08-08 NOTE — PHYSICAL EXAM
[Right] : right knee [5___] : hamstring 5[unfilled]/5 [Negative] : negative Ruthy's [] : negative Lachmann [TWNoteComboBox7] : flexion 115 degrees [de-identified] : extension 0 degrees

## 2024-08-15 ENCOUNTER — APPOINTMENT (OUTPATIENT)
Dept: ORTHOPEDIC SURGERY | Facility: CLINIC | Age: 69
End: 2024-08-15
Payer: MEDICARE

## 2024-08-15 DIAGNOSIS — M17.11 UNILATERAL PRIMARY OSTEOARTHRITIS, RIGHT KNEE: ICD-10-CM

## 2024-08-15 PROCEDURE — 20611 DRAIN/INJ JOINT/BURSA W/US: CPT | Mod: RT

## 2024-08-15 NOTE — DISCUSSION/SUMMARY
[de-identified] : rest, ice, activity modification.  Patient allowed to gently start resuming activities.   Frequency of visco and csi reviewed. Return in 6 weeks as needed.  08/15/2024    [id number]    Attention:  Nurse Reviewer /Medical Director  I am writing this letter as a medical necessity for PT program. Patient has tried analgesics, non-steroid anti-inflammatory agents,  hot or cold compresses,injections of corticosteroids, etc)  which in combination or by themselves has not worked. Based on my patient's condition, I strongly believe that the PT is medically needed.   Thank you for your time and consideration.

## 2024-08-15 NOTE — PHYSICAL EXAM
[Right] : right knee [5___] : hamstring 5[unfilled]/5 [Negative] : negative Ruthy's [] : negative Lachmann [TWNoteComboBox7] : flexion 115 degrees [de-identified] : extension 0 degrees

## 2024-08-15 NOTE — HISTORY OF PRESENT ILLNESS
[de-identified] : The right knee pain persists. Orthovisc injection #4 for the right knee. She reports no issues with the prior injections. No swellig

## 2024-12-16 ENCOUNTER — APPOINTMENT (OUTPATIENT)
Dept: SURGICAL ONCOLOGY | Facility: CLINIC | Age: 69
End: 2024-12-16
Payer: MEDICARE

## 2024-12-16 VITALS
DIASTOLIC BLOOD PRESSURE: 82 MMHG | OXYGEN SATURATION: 97 % | WEIGHT: 136 LBS | HEART RATE: 83 BPM | BODY MASS INDEX: 23.8 KG/M2 | HEIGHT: 63.5 IN | SYSTOLIC BLOOD PRESSURE: 118 MMHG

## 2024-12-16 DIAGNOSIS — C43.70 MALIGNANT MELANOMA OF UNSPECIFIED LOWER LIMB, INCLUDING HIP: ICD-10-CM

## 2024-12-16 PROCEDURE — 99214 OFFICE O/P EST MOD 30 MIN: CPT

## 2025-01-31 ENCOUNTER — APPOINTMENT (OUTPATIENT)
Dept: ORTHOPEDIC SURGERY | Facility: CLINIC | Age: 70
End: 2025-01-31
Payer: MEDICARE

## 2025-01-31 DIAGNOSIS — M54.16 RADICULOPATHY, LUMBAR REGION: ICD-10-CM

## 2025-01-31 DIAGNOSIS — S76.012A STRAIN OF MUSCLE, FASCIA AND TENDON OF LEFT HIP, INITIAL ENCOUNTER: ICD-10-CM

## 2025-01-31 PROCEDURE — 99214 OFFICE O/P EST MOD 30 MIN: CPT | Mod: 25

## 2025-01-31 PROCEDURE — 20610 DRAIN/INJ JOINT/BURSA W/O US: CPT | Mod: LT

## 2025-01-31 RX ORDER — METHYLPREDNISOLONE 4 MG/1
4 TABLET ORAL
Qty: 1 | Refills: 0 | Status: ACTIVE | COMMUNITY
Start: 2025-01-31 | End: 1900-01-01

## 2025-01-31 RX ORDER — MELOXICAM 15 MG/1
15 TABLET ORAL
Qty: 30 | Refills: 1 | Status: ACTIVE | COMMUNITY
Start: 2025-01-31 | End: 1900-01-01

## 2025-03-07 ENCOUNTER — APPOINTMENT (OUTPATIENT)
Dept: ORTHOPEDIC SURGERY | Facility: CLINIC | Age: 70
End: 2025-03-07
Payer: MEDICARE

## 2025-03-07 ENCOUNTER — RESULT REVIEW (OUTPATIENT)
Age: 70
End: 2025-03-07

## 2025-03-07 DIAGNOSIS — M54.16 RADICULOPATHY, LUMBAR REGION: ICD-10-CM

## 2025-03-07 DIAGNOSIS — S76.012A STRAIN OF MUSCLE, FASCIA AND TENDON OF LEFT HIP, INITIAL ENCOUNTER: ICD-10-CM

## 2025-03-07 PROCEDURE — 99214 OFFICE O/P EST MOD 30 MIN: CPT

## 2025-03-21 ENCOUNTER — APPOINTMENT (OUTPATIENT)
Dept: PAIN MANAGEMENT | Facility: CLINIC | Age: 70
End: 2025-03-21
Payer: MEDICARE

## 2025-03-21 VITALS — WEIGHT: 137 LBS | BODY MASS INDEX: 24.27 KG/M2 | HEIGHT: 63 IN

## 2025-03-21 DIAGNOSIS — M16.12 UNILATERAL PRIMARY OSTEOARTHRITIS, LEFT HIP: ICD-10-CM

## 2025-03-21 PROCEDURE — 99204 OFFICE O/P NEW MOD 45 MIN: CPT

## 2025-03-21 RX ORDER — DICLOFENAC SODIUM 75 MG/1
75 TABLET, DELAYED RELEASE ORAL
Qty: 60 | Refills: 0 | Status: ACTIVE | COMMUNITY
Start: 2025-03-21 | End: 1900-01-01

## 2025-03-26 ENCOUNTER — RX RENEWAL (OUTPATIENT)
Age: 70
End: 2025-03-26

## 2025-04-08 ENCOUNTER — APPOINTMENT (OUTPATIENT)
Dept: PAIN MANAGEMENT | Facility: CLINIC | Age: 70
End: 2025-04-08

## 2025-04-17 ENCOUNTER — RX RENEWAL (OUTPATIENT)
Age: 70
End: 2025-04-17

## 2025-04-25 ENCOUNTER — APPOINTMENT (OUTPATIENT)
Dept: PAIN MANAGEMENT | Facility: CLINIC | Age: 70
End: 2025-04-25

## 2025-04-28 ENCOUNTER — APPOINTMENT (OUTPATIENT)
Dept: OBGYN | Facility: CLINIC | Age: 70
End: 2025-04-28
Payer: MEDICARE

## 2025-04-28 PROCEDURE — G0444 DEPRESSION SCREEN ANNUAL: CPT

## 2025-04-28 PROCEDURE — G0101: CPT | Mod: GA

## 2025-06-05 ENCOUNTER — APPOINTMENT (OUTPATIENT)
Dept: ORTHOPEDIC SURGERY | Facility: CLINIC | Age: 70
End: 2025-06-05
Payer: MEDICARE

## 2025-06-05 PROCEDURE — 99203 OFFICE O/P NEW LOW 30 MIN: CPT | Mod: 25

## 2025-06-05 PROCEDURE — 73564 X-RAY EXAM KNEE 4 OR MORE: CPT | Mod: RT

## 2025-06-05 PROCEDURE — 99213 OFFICE O/P EST LOW 20 MIN: CPT | Mod: 25

## 2025-06-05 PROCEDURE — 20611 DRAIN/INJ JOINT/BURSA W/US: CPT | Mod: RT

## 2025-06-12 ENCOUNTER — APPOINTMENT (OUTPATIENT)
Dept: ORTHOPEDIC SURGERY | Facility: CLINIC | Age: 70
End: 2025-06-12
Payer: MEDICARE

## 2025-06-12 PROCEDURE — 20611 DRAIN/INJ JOINT/BURSA W/US: CPT | Mod: RT

## 2025-06-19 ENCOUNTER — APPOINTMENT (OUTPATIENT)
Dept: ORTHOPEDIC SURGERY | Facility: CLINIC | Age: 70
End: 2025-06-19
Payer: MEDICARE

## 2025-06-19 PROCEDURE — 20611 DRAIN/INJ JOINT/BURSA W/US: CPT | Mod: RT

## 2025-06-26 ENCOUNTER — APPOINTMENT (OUTPATIENT)
Dept: ORTHOPEDIC SURGERY | Facility: CLINIC | Age: 70
End: 2025-06-26

## 2025-06-26 ENCOUNTER — APPOINTMENT (OUTPATIENT)
Dept: ORTHOPEDIC SURGERY | Facility: CLINIC | Age: 70
End: 2025-06-26
Payer: MEDICARE

## 2025-06-26 PROCEDURE — 20611 DRAIN/INJ JOINT/BURSA W/US: CPT | Mod: LT

## 2025-07-16 ENCOUNTER — APPOINTMENT (OUTPATIENT)
Dept: ORTHOPEDIC SURGERY | Facility: CLINIC | Age: 70
End: 2025-07-16
Payer: MEDICARE

## 2025-07-16 VITALS — WEIGHT: 135 LBS | HEIGHT: 63 IN | BODY MASS INDEX: 23.92 KG/M2

## 2025-07-16 PROCEDURE — 99204 OFFICE O/P NEW MOD 45 MIN: CPT

## (undated) DEVICE — DRSG ADAPTIC 3 X 8"

## (undated) DEVICE — DRSG TEGADERM 6"X8"

## (undated) DEVICE — DRSG COMBINE 5X9"

## (undated) DEVICE — ELCTR 4-DISC 20MM 49" (RED, BLUE, GREEN, BLACK)

## (undated) DEVICE — SUT SURGIPRO II 4-0 18" P-12

## (undated) DEVICE — DRSG STOCKINETTE IMPERVIOUS XL

## (undated) DEVICE — WARMING BLANKET LOWER ADULT

## (undated) DEVICE — PACK MINOR W TRANS LAP

## (undated) DEVICE — DRSG STOCKINETTE IMPERVIOUS MED

## (undated) DEVICE — DRSG DERMABOND MINI

## (undated) DEVICE — BASIN SET DOUBLE

## (undated) DEVICE — DRSG KLING 4"

## (undated) DEVICE — DRAPE INSTRUMENT POUCH 6.75" X 11"

## (undated) DEVICE — FOLEY TRAY 16FR 5CC LTX UMETER CLOSED

## (undated) DEVICE — DRAIN RESERVOIR FOR JACKSON PRATT 100CC CARDINAL

## (undated) DEVICE — SUT VICRYL 2-0 27" SH UNDYED

## (undated) DEVICE — DRSG STERISTRIPS 0.5 X 4"

## (undated) DEVICE — MEDICATION LABELS W MARKER

## (undated) DEVICE — SPECIMEN CONTAINER 100ML

## (undated) DEVICE — PREP CHLORAPREP HI-LITE ORANGE 26ML

## (undated) DEVICE — SOL IRR POUR H2O 250ML

## (undated) DEVICE — DRSG COBAN 4"

## (undated) DEVICE — ELCTR GROUNDING PAD ADULT COVIDIEN

## (undated) DEVICE — DRAPE TOWEL BLUE 17" X 24"

## (undated) DEVICE — SUT POLYSORB 3-0 30" V-20 UNDYED

## (undated) DEVICE — PREP BETADINE KIT

## (undated) DEVICE — PREP BETADINE 5% STERILE OPTHALMIC SOLUTION

## (undated) DEVICE — SUT VICRYL 3-0 27" SH UNDYED

## (undated) DEVICE — PACK MINOR WITH LAP

## (undated) DEVICE — LIGASURE SMALL JAW

## (undated) DEVICE — DRSG XEROFORM 5 X 9"

## (undated) DEVICE — GOWN TRIMAX LG

## (undated) DEVICE — DRAIN JACKSON PRATT 10MM FLAT FULL NO TROCAR

## (undated) DEVICE — DRAPE MAYO STAND 30"

## (undated) DEVICE — GLV 7 PROTEXIS (WHITE)

## (undated) DEVICE — DRAPE 3/4 SHEET 52X76"

## (undated) DEVICE — SYR LUER LOK 20CC

## (undated) DEVICE — PROTECTOR HEEL / ELBOW FLUFFY

## (undated) DEVICE — STAPLER SKIN VISI-STAT 35 WIDE

## (undated) DEVICE — SOL IRR POUR NS 0.9% 500ML

## (undated) DEVICE — MARKING PEN W RULER

## (undated) DEVICE — LABELS BLANK W PEN

## (undated) DEVICE — SUT MONOCRYL 4-0 18" P-3 UNDYED

## (undated) DEVICE — BLADE SCALPEL SAFETYLOCK #15

## (undated) DEVICE — DRAPE 1/2 SHEET 40X57"

## (undated) DEVICE — DRSG TEGADERM 1.75X1.75"

## (undated) DEVICE — DRAPE LAPAROTOMY TRANSVERSE

## (undated) DEVICE — BLADE SCALPEL SAFETYLOCK #10

## (undated) DEVICE — POSITIONER STRAP ARMBOARD VELCRO TS-30

## (undated) DEVICE — SPEAR SURG EYE WECK-CELL CELOS

## (undated) DEVICE — POSITIONER FOAM EGG CRATE ULNAR 2PCS (PINK)

## (undated) DEVICE — ELCTR BOVIE PENCIL SMOKE EVACUATION

## (undated) DEVICE — DRAIN BLAKE 15FR BARD CHANNEL

## (undated) DEVICE — WARMING BLANKET UPPER ADULT

## (undated) DEVICE — DRSG OPSITE 13.75 X 4"

## (undated) DEVICE — GAMMA SLEEVE DISPOSABLE

## (undated) DEVICE — DRSG CURITY GAUZE SPONGE 4 X 4" 12-PLY

## (undated) DEVICE — DRSG VAC WHITEFOAM LARGE (WHITE)

## (undated) DEVICE — VENODYNE/SCD SLEEVE CALF MEDIUM

## (undated) DEVICE — DRAPE SPLIT SHEET 77" X 108"